# Patient Record
Sex: MALE | HISPANIC OR LATINO | Employment: OTHER | URBAN - METROPOLITAN AREA
[De-identification: names, ages, dates, MRNs, and addresses within clinical notes are randomized per-mention and may not be internally consistent; named-entity substitution may affect disease eponyms.]

---

## 2017-12-06 ENCOUNTER — OFFICE VISIT (OUTPATIENT)
Dept: URGENT CARE | Facility: PHYSICIAN GROUP | Age: 67
End: 2017-12-06
Payer: MEDICARE

## 2017-12-06 ENCOUNTER — TELEPHONE (OUTPATIENT)
Dept: URGENT CARE | Facility: PHYSICIAN GROUP | Age: 67
End: 2017-12-06

## 2017-12-06 VITALS
SYSTOLIC BLOOD PRESSURE: 142 MMHG | RESPIRATION RATE: 16 BRPM | DIASTOLIC BLOOD PRESSURE: 84 MMHG | HEIGHT: 69 IN | BODY MASS INDEX: 30.21 KG/M2 | TEMPERATURE: 98 F | WEIGHT: 204 LBS | HEART RATE: 78 BPM | OXYGEN SATURATION: 97 %

## 2017-12-06 DIAGNOSIS — K04.7 TOOTH INFECTION: ICD-10-CM

## 2017-12-06 DIAGNOSIS — K00.4 ENAMEL DEFECT OF TOOTH: ICD-10-CM

## 2017-12-06 PROCEDURE — 99214 OFFICE O/P EST MOD 30 MIN: CPT | Performed by: PHYSICIAN ASSISTANT

## 2017-12-06 RX ORDER — TRAMADOL HYDROCHLORIDE 50 MG/1
50 TABLET ORAL EVERY 4 HOURS PRN
Qty: 15 TAB | Refills: 0 | Status: SHIPPED | OUTPATIENT
Start: 2017-12-06 | End: 2017-12-09

## 2017-12-06 RX ORDER — LISINOPRIL AND HYDROCHLOROTHIAZIDE 20; 12.5 MG/1; MG/1
1 TABLET ORAL DAILY
COMMUNITY
Start: 2017-11-30 | End: 2023-03-16 | Stop reason: SDUPTHER

## 2017-12-06 RX ORDER — AMOXICILLIN 875 MG/1
875 TABLET, COATED ORAL 2 TIMES DAILY
Qty: 20 TAB | Refills: 0 | Status: SHIPPED | OUTPATIENT
Start: 2017-12-06 | End: 2017-12-16

## 2017-12-06 RX ORDER — LEVOTHYROXINE SODIUM 125 MCG
TABLET ORAL
COMMUNITY
Start: 2017-10-23 | End: 2019-03-10

## 2017-12-06 ASSESSMENT — ENCOUNTER SYMPTOMS
EYES NEGATIVE: 1
NEUROLOGICAL NEGATIVE: 1
CONSTITUTIONAL NEGATIVE: 1
MUSCULOSKELETAL NEGATIVE: 1
GASTROINTESTINAL NEGATIVE: 1
RESPIRATORY NEGATIVE: 1
PSYCHIATRIC NEGATIVE: 1
CARDIOVASCULAR NEGATIVE: 1

## 2017-12-06 NOTE — PROGRESS NOTES
Subjective:      Deepak Macdonald is a 67 y.o. male who presents with Other (tooth pain upper right side X 2 days )            HPI  Chief Complaint   Patient presents with   • Other     tooth pain upper right side X 2 days        HPI:  Deepak Macdonald is a 67 y.o. Male who presents with son with right upper tooth pain for 2 days. No nausea or fever. History of tooth infections in the past. Does have an appointment with the dentist on December 20. Exquisitely tender to heat and cold. No bad taste or purulent discharge. Patient denies HA, SOB, chest pain, palpitations, fever, chills, or n/v/d.      Past Medical History:   Diagnosis Date   • cancer 2004    lymphoma   • Cancer (CMS-[facility]) 2007    thyroid   • Depression    • Heart burn    • High cholesterol    • Kidney stones    • Lymphoma (CMS-[facility])     2004   • Snoring    • Thyroid disease        Past Surgical History:   Procedure Laterality Date   • CYSTOSCOPY  9/16/2015    Procedure: CYSTOSCOPY;  Surgeon: Sd Khanna M.D.;  Location: [facility];  Service:    • URETEROSCOPY Right 9/16/2015    Procedure: URETEROSCOPY;  Surgeon: Sd Khanna M.D.;  Location: [facility];  Service:    • LASERTRIPSY Right 9/16/2015    Procedure: LASERTRIPSY LITHO;  Surgeon: Sd Khanna M.D.;  Location: [facility];  Service:    • THYROIDECTOMY  2007   • OTHER      lymph nodes removed       Family History   Problem Relation Age of Onset   • Cancer Sister      breast     No pertinent family history.    Social History     Social History   • Marital status:      Spouse name: N/A   • Number of children: N/A   • Years of education: N/A     Occupational History   • Not on file.     Social History Main Topics   • Smoking status: Never Smoker   • Smokeless tobacco: Never Used   • Alcohol use No      Comment: quit drinking about a year ago   • Drug use: No   • Sexual activity: Yes     Partners: Female     Other Topics Concern   • Not on file  "    Social History Narrative   • No narrative on file         Current Outpatient Prescriptions:   •  lisinopril-hydrochlorothiazide,   •  SYNTHROID,   •  amoxicillin, 875 mg, Oral, BID  •  tramadol, 50 mg, Oral, Q4HRS PRN  •  levothyroxine, 137 mcg, Oral, AM ES  •  atorvastatin, 10 mg, Oral, Nightly  •  tramadol,  mg, Oral, Q6HRS PRN    No Known Allergies      Review of Systems   Constitutional: Negative.    HENT:        Tooth infection, tooth pain   Eyes: Negative.    Respiratory: Negative.    Cardiovascular: Negative.    Gastrointestinal: Negative.    Genitourinary: Negative.    Musculoskeletal: Negative.    Skin: Negative.    Neurological: Negative.    Endo/Heme/Allergies: Negative.    Psychiatric/Behavioral: Negative.           Objective:     /84   Pulse 78   Temp 36.7 °C (98 °F)   Resp 16   Ht 1.763 m (5' 9.41\")   Wt 92.5 kg (204 lb)   SpO2 97%   BMI 29.77 kg/m²      Physical Exam       Nursing note and Vitals Reviewed.    Constitutional:   Appropriately groomed, pleasant affect, well nourished, in NAD.    Head:   Normocephalic, atraumatic.    Eyes:   PERRLA, EOM's full, sclera white, conjunctiva not erythematous, and medial canthus without exudate bilaterally.    Ears:  Auricle and tragus non-tender to manipulation.  No pre-auricular lymphadenopathy or mastoid ttp.  EACs with mild cerumen bilaterally, not erythematous.  TM’s pearly gray with cone of light present and umbo and malleolus visible bilaterally.  No bulging or fluid bubbles present in middle ear.  Hearing grossly intact to voice.    Nose:  Nares patent bilaterally.  Nasal mucosa edematous with white rhinorrhea bilaterally. Sinuses not tender to percussion.    Throat:  Dentition poor with multiple caries present, mucosa moist without lesions. Right upper canine with enamel erosion on the buccal surface, exquisitely tender to palpation. Oropharynx not  erythematous, with no enlargement of the palatine tonsils bilaterally with " outexudates.    Mild post nasal drainage present.  Soft palate rises symmetrically bilaterally and uvula midline.      Neck: Neck supple, with moderate anterior lymphadenopathy that is soft and mobile to palpation. Thyroid non-palpable without tenderness or nodules. No supraclavicular lymphadenopathy.    Lungs:  Respiratory effort not labored without accessory muscle use.  Lungs clear to auscultation bilaterally without wheezes, rales, or rhonchi.    Heart:  RRR, without murmurs rubs or gallops.  Radial and dorsalis pedis pulse 2+ bilaterally.  No LE edema.    Musculoskeletal:  Gait non-antalgic with a narrow base.    Derm:  Skin without rashes or lesions with good turgor pressure.      Psychiatric:  Mood, affect, and judgement appropriate.       Assessment/Plan:     1. Tooth infection  amoxicillin (AMOXIL) 875 MG tablet    tramadol (ULTRAM) 50 MG Tab   2. Enamel defect of tooth  amoxicillin (AMOXIL) 875 MG tablet    tramadol (ULTRAM) 50 MG Tab      Patient presents with right upper canine tooth infection with enamel erosion and root exposure/dentin exposure. Plan to treat with amoxicillin and salt water gargles. Patient has tolerated this in the past for previous tooth infection 2015. As a prescribed tramadol. Recommended ibuprofen and Tylenol combination and tramadol staggered every 2-3 hours. Advised patient contact dentist for possible early cancellations.    Narcotic use report obtained with no indication of narcotic overuse or misuse and deemed necessary for treatment. Sedation, dependence, and constipation precautions given. Avoid use while driving or operating heavy machinery.     Patient was in agreement with this treatment plan and seemed to understand without barriers. All questions were encouraged and answered.  Reviewed signs and symptoms of when to seek emergency medical care.     Please note that this dictation was created using voice recognition software.  I have made every reasonable attempt to  correct obvious errors, but I expect there are errors of jose and possibly content that I did not discover before finalizing the note.

## 2017-12-06 NOTE — PATIENT INSTRUCTIONS
1 tablet of tylenol with 1 tablet of ibuprofen.  Take every 6 hours with food.    Take the tramadol every 4-6 hours and consider off setting every 2-3 hours.  Amoxicillin 2 times a day for 10 days.  Salt water gargles, warm.  Call dentist.

## 2017-12-06 NOTE — TELEPHONE ENCOUNTER
Pt called stating medication he was prescribed this morning is not working and his pain is really bad. I encouraged him to give the med's a chance to start working as it's only been a few minutes since he picked it up and took it.     Pt stated he understood and had no further questions.

## 2019-03-10 ENCOUNTER — APPOINTMENT (OUTPATIENT)
Dept: RADIOLOGY | Facility: MEDICAL CENTER | Age: 69
End: 2019-03-10
Attending: EMERGENCY MEDICINE
Payer: MEDICARE

## 2019-03-10 ENCOUNTER — OFFICE VISIT (OUTPATIENT)
Dept: URGENT CARE | Facility: PHYSICIAN GROUP | Age: 69
End: 2019-03-10
Payer: MEDICARE

## 2019-03-10 ENCOUNTER — HOSPITAL ENCOUNTER (OUTPATIENT)
Facility: MEDICAL CENTER | Age: 69
End: 2019-03-11
Attending: EMERGENCY MEDICINE | Admitting: INTERNAL MEDICINE
Payer: MEDICARE

## 2019-03-10 VITALS
HEIGHT: 69 IN | DIASTOLIC BLOOD PRESSURE: 82 MMHG | OXYGEN SATURATION: 97 % | SYSTOLIC BLOOD PRESSURE: 152 MMHG | TEMPERATURE: 98 F | WEIGHT: 204 LBS | HEART RATE: 88 BPM | RESPIRATION RATE: 18 BRPM | BODY MASS INDEX: 30.21 KG/M2

## 2019-03-10 DIAGNOSIS — R51.9 ACUTE NONINTRACTABLE HEADACHE, UNSPECIFIED HEADACHE TYPE: ICD-10-CM

## 2019-03-10 DIAGNOSIS — R42 DIZZINESS: ICD-10-CM

## 2019-03-10 DIAGNOSIS — R07.9 CHEST PAIN, UNSPECIFIED TYPE: ICD-10-CM

## 2019-03-10 PROBLEM — I10 ESSENTIAL HYPERTENSION: Status: ACTIVE | Noted: 2019-03-10

## 2019-03-10 LAB
ALBUMIN SERPL BCP-MCNC: 4.7 G/DL (ref 3.2–4.9)
ALBUMIN/GLOB SERPL: 1.7 G/DL
ALP SERPL-CCNC: 81 U/L (ref 30–99)
ALT SERPL-CCNC: 23 U/L (ref 2–50)
ANION GAP SERPL CALC-SCNC: 11 MMOL/L (ref 0–11.9)
AST SERPL-CCNC: 23 U/L (ref 12–45)
BASOPHILS # BLD AUTO: 0.8 % (ref 0–1.8)
BASOPHILS # BLD: 0.05 K/UL (ref 0–0.12)
BILIRUB SERPL-MCNC: 0.8 MG/DL (ref 0.1–1.5)
BUN SERPL-MCNC: 15 MG/DL (ref 8–22)
CALCIUM SERPL-MCNC: 9.9 MG/DL (ref 8.5–10.5)
CHLORIDE SERPL-SCNC: 104 MMOL/L (ref 96–112)
CO2 SERPL-SCNC: 24 MMOL/L (ref 20–33)
CREAT SERPL-MCNC: 0.86 MG/DL (ref 0.5–1.4)
EKG IMPRESSION: NORMAL
EKG IMPRESSION: NORMAL
EOSINOPHIL # BLD AUTO: 0.04 K/UL (ref 0–0.51)
EOSINOPHIL NFR BLD: 0.6 % (ref 0–6.9)
ERYTHROCYTE [DISTWIDTH] IN BLOOD BY AUTOMATED COUNT: 45.4 FL (ref 35.9–50)
GLOBULIN SER CALC-MCNC: 2.7 G/DL (ref 1.9–3.5)
GLUCOSE BLD-MCNC: 129 MG/DL (ref 70–100)
GLUCOSE SERPL-MCNC: 103 MG/DL (ref 65–99)
HCT VFR BLD AUTO: 45.3 % (ref 42–52)
HGB BLD-MCNC: 15.4 G/DL (ref 14–18)
IMM GRANULOCYTES # BLD AUTO: 0.02 K/UL (ref 0–0.11)
IMM GRANULOCYTES NFR BLD AUTO: 0.3 % (ref 0–0.9)
LIPASE SERPL-CCNC: 19 U/L (ref 11–82)
LYMPHOCYTES # BLD AUTO: 1.49 K/UL (ref 1–4.8)
LYMPHOCYTES NFR BLD: 22.4 % (ref 22–41)
MCH RBC QN AUTO: 29.7 PG (ref 27–33)
MCHC RBC AUTO-ENTMCNC: 34 G/DL (ref 33.7–35.3)
MCV RBC AUTO: 87.3 FL (ref 81.4–97.8)
MONOCYTES # BLD AUTO: 0.47 K/UL (ref 0–0.85)
MONOCYTES NFR BLD AUTO: 7.1 % (ref 0–13.4)
NEUTROPHILS # BLD AUTO: 4.57 K/UL (ref 1.82–7.42)
NEUTROPHILS NFR BLD: 68.8 % (ref 44–72)
NRBC # BLD AUTO: 0 K/UL
NRBC BLD-RTO: 0 /100 WBC
PLATELET # BLD AUTO: 208 K/UL (ref 164–446)
PMV BLD AUTO: 11.2 FL (ref 9–12.9)
POTASSIUM SERPL-SCNC: 4 MMOL/L (ref 3.6–5.5)
PROT SERPL-MCNC: 7.4 G/DL (ref 6–8.2)
RBC # BLD AUTO: 5.19 M/UL (ref 4.7–6.1)
SODIUM SERPL-SCNC: 139 MMOL/L (ref 135–145)
T4 FREE SERPL-MCNC: 1.29 NG/DL (ref 0.53–1.43)
TROPONIN I SERPL-MCNC: <0.01 NG/ML (ref 0–0.04)
TSH SERPL DL<=0.005 MIU/L-ACNC: 0.54 UIU/ML (ref 0.38–5.33)
WBC # BLD AUTO: 6.6 K/UL (ref 4.8–10.8)

## 2019-03-10 PROCEDURE — 99214 OFFICE O/P EST MOD 30 MIN: CPT | Performed by: NURSE PRACTITIONER

## 2019-03-10 PROCEDURE — A9270 NON-COVERED ITEM OR SERVICE: HCPCS | Performed by: EMERGENCY MEDICINE

## 2019-03-10 PROCEDURE — 96375 TX/PRO/DX INJ NEW DRUG ADDON: CPT

## 2019-03-10 PROCEDURE — 84439 ASSAY OF FREE THYROXINE: CPT

## 2019-03-10 PROCEDURE — G0378 HOSPITAL OBSERVATION PER HR: HCPCS

## 2019-03-10 PROCEDURE — A9270 NON-COVERED ITEM OR SERVICE: HCPCS | Performed by: INTERNAL MEDICINE

## 2019-03-10 PROCEDURE — 84443 ASSAY THYROID STIM HORMONE: CPT

## 2019-03-10 PROCEDURE — 85025 COMPLETE CBC W/AUTO DIFF WBC: CPT

## 2019-03-10 PROCEDURE — 71045 X-RAY EXAM CHEST 1 VIEW: CPT

## 2019-03-10 PROCEDURE — 700111 HCHG RX REV CODE 636 W/ 250 OVERRIDE (IP): Performed by: EMERGENCY MEDICINE

## 2019-03-10 PROCEDURE — 700102 HCHG RX REV CODE 250 W/ 637 OVERRIDE(OP): Performed by: INTERNAL MEDICINE

## 2019-03-10 PROCEDURE — 93000 ELECTROCARDIOGRAM COMPLETE: CPT | Performed by: NURSE PRACTITIONER

## 2019-03-10 PROCEDURE — 93005 ELECTROCARDIOGRAM TRACING: CPT | Performed by: EMERGENCY MEDICINE

## 2019-03-10 PROCEDURE — 84484 ASSAY OF TROPONIN QUANT: CPT | Mod: 91

## 2019-03-10 PROCEDURE — 96374 THER/PROPH/DIAG INJ IV PUSH: CPT

## 2019-03-10 PROCEDURE — 93010 ELECTROCARDIOGRAM REPORT: CPT | Performed by: INTERNAL MEDICINE

## 2019-03-10 PROCEDURE — 700102 HCHG RX REV CODE 250 W/ 637 OVERRIDE(OP): Performed by: EMERGENCY MEDICINE

## 2019-03-10 PROCEDURE — 99285 EMERGENCY DEPT VISIT HI MDM: CPT

## 2019-03-10 PROCEDURE — 83690 ASSAY OF LIPASE: CPT

## 2019-03-10 PROCEDURE — 36415 COLL VENOUS BLD VENIPUNCTURE: CPT

## 2019-03-10 PROCEDURE — 93005 ELECTROCARDIOGRAM TRACING: CPT | Performed by: INTERNAL MEDICINE

## 2019-03-10 PROCEDURE — 82962 GLUCOSE BLOOD TEST: CPT | Performed by: NURSE PRACTITIONER

## 2019-03-10 PROCEDURE — 80053 COMPREHEN METABOLIC PANEL: CPT

## 2019-03-10 PROCEDURE — 99220 PR INITIAL OBSERVATION CARE,LEVL III: CPT | Performed by: INTERNAL MEDICINE

## 2019-03-10 RX ORDER — ONDANSETRON 2 MG/ML
4 INJECTION INTRAMUSCULAR; INTRAVENOUS ONCE
Status: COMPLETED | OUTPATIENT
Start: 2019-03-10 | End: 2019-03-10

## 2019-03-10 RX ORDER — LEVOTHYROXINE SODIUM 112 MCG
100 TABLET ORAL DAILY
COMMUNITY
Start: 2019-02-06

## 2019-03-10 RX ORDER — POLYETHYLENE GLYCOL 3350 17 G/17G
1 POWDER, FOR SOLUTION ORAL
Status: DISCONTINUED | OUTPATIENT
Start: 2019-03-10 | End: 2019-03-11 | Stop reason: HOSPADM

## 2019-03-10 RX ORDER — CHOLESTYRAMINE LIGHT 4 G/5.7G
4 POWDER, FOR SUSPENSION ORAL DAILY
COMMUNITY
End: 2023-03-16

## 2019-03-10 RX ORDER — METOCLOPRAMIDE HYDROCHLORIDE 5 MG/ML
10 INJECTION INTRAMUSCULAR; INTRAVENOUS ONCE
Status: COMPLETED | OUTPATIENT
Start: 2019-03-10 | End: 2019-03-10

## 2019-03-10 RX ORDER — ASPIRIN 300 MG/1
300 SUPPOSITORY RECTAL DAILY
Status: DISCONTINUED | OUTPATIENT
Start: 2019-03-10 | End: 2019-03-11 | Stop reason: HOSPADM

## 2019-03-10 RX ORDER — ASPIRIN 325 MG
325 TABLET ORAL DAILY
Status: DISCONTINUED | OUTPATIENT
Start: 2019-03-10 | End: 2019-03-11 | Stop reason: HOSPADM

## 2019-03-10 RX ORDER — LISINOPRIL 20 MG/1
20 TABLET ORAL
Status: DISCONTINUED | OUTPATIENT
Start: 2019-03-10 | End: 2019-03-11 | Stop reason: HOSPADM

## 2019-03-10 RX ORDER — BISACODYL 10 MG
10 SUPPOSITORY, RECTAL RECTAL
Status: DISCONTINUED | OUTPATIENT
Start: 2019-03-10 | End: 2019-03-11 | Stop reason: HOSPADM

## 2019-03-10 RX ORDER — ACETAMINOPHEN 325 MG/1
650 TABLET ORAL EVERY 6 HOURS PRN
Status: DISCONTINUED | OUTPATIENT
Start: 2019-03-10 | End: 2019-03-11 | Stop reason: HOSPADM

## 2019-03-10 RX ORDER — AMOXICILLIN 250 MG
2 CAPSULE ORAL 2 TIMES DAILY
Status: DISCONTINUED | OUTPATIENT
Start: 2019-03-10 | End: 2019-03-11 | Stop reason: HOSPADM

## 2019-03-10 RX ORDER — LEVOTHYROXINE SODIUM 112 UG/1
112 TABLET ORAL
Status: DISCONTINUED | OUTPATIENT
Start: 2019-03-11 | End: 2019-03-11 | Stop reason: HOSPADM

## 2019-03-10 RX ORDER — M-VIT,TX,IRON,MINS/CALC/FOLIC 27MG-0.4MG
1 TABLET ORAL DAILY
COMMUNITY

## 2019-03-10 RX ORDER — ACETAMINOPHEN 325 MG/1
650 TABLET ORAL ONCE
Status: COMPLETED | OUTPATIENT
Start: 2019-03-10 | End: 2019-03-10

## 2019-03-10 RX ORDER — ASPIRIN 81 MG/1
324 TABLET, CHEWABLE ORAL DAILY
Status: DISCONTINUED | OUTPATIENT
Start: 2019-03-10 | End: 2019-03-11 | Stop reason: HOSPADM

## 2019-03-10 RX ADMIN — LISINOPRIL 20 MG: 20 TABLET ORAL at 16:54

## 2019-03-10 RX ADMIN — ACETAMINOPHEN 650 MG: 325 TABLET, FILM COATED ORAL at 14:12

## 2019-03-10 RX ADMIN — ASPIRIN 325 MG: 325 TABLET ORAL at 16:54

## 2019-03-10 RX ADMIN — METOCLOPRAMIDE 10 MG: 5 INJECTION, SOLUTION INTRAMUSCULAR; INTRAVENOUS at 14:11

## 2019-03-10 RX ADMIN — ONDANSETRON 4 MG: 2 INJECTION INTRAMUSCULAR; INTRAVENOUS at 14:09

## 2019-03-10 ASSESSMENT — ENCOUNTER SYMPTOMS
PALPITATIONS: 0
DIARRHEA: 0
FOCAL WEAKNESS: 0
NERVOUS/ANXIOUS: 0
BLURRED VISION: 0
DIZZINESS: 0
VOMITING: 0
SEIZURES: 0
BACK PAIN: 0
COUGH: 0
ABDOMINAL PAIN: 0
SPUTUM PRODUCTION: 0
EYE REDNESS: 0
EYE PAIN: 0
MYALGIAS: 0
SHORTNESS OF BREATH: 0
WEIGHT LOSS: 0
FEVER: 0
NAUSEA: 0
CHILLS: 0
ORTHOPNEA: 0
EYE DISCHARGE: 0
DEPRESSION: 0
INSOMNIA: 0
STRIDOR: 0
NECK PAIN: 0
HEARTBURN: 0
HEADACHES: 0

## 2019-03-10 ASSESSMENT — LIFESTYLE VARIABLES
ALCOHOL_USE: NO
DO YOU DRINK ALCOHOL: NO
EVER_SMOKED: NEVER

## 2019-03-10 ASSESSMENT — PATIENT HEALTH QUESTIONNAIRE - PHQ9
2. FEELING DOWN, DEPRESSED, IRRITABLE, OR HOPELESS: NOT AT ALL
1. LITTLE INTEREST OR PLEASURE IN DOING THINGS: NOT AT ALL
SUM OF ALL RESPONSES TO PHQ9 QUESTIONS 1 AND 2: 0
1. LITTLE INTEREST OR PLEASURE IN DOING THINGS: NOT AT ALL
SUM OF ALL RESPONSES TO PHQ9 QUESTIONS 1 AND 2: 0
2. FEELING DOWN, DEPRESSED, IRRITABLE, OR HOPELESS: NOT AT ALL

## 2019-03-10 NOTE — ED TRIAGE NOTES
".  Chief Complaint   Patient presents with   • Sent from Urgent Care   Pt reports having 3/10 chest pain yesterday, resolved. Pt states \" felt palpitations, then it went away. \"   No N/V.  No difficulty breathing.  Right parietal region headache onset this morning at 0600.  No dizziness.  + minor lightheadedness.  No ear pain/congestion.    "

## 2019-03-10 NOTE — ED PROVIDER NOTES
"ED Provider Note    Scribed for Fritz Perez M.D. by Shad Duran. 3/10/2019, 1:50 PM.    Primary care provider: David Solis D.O.  Means of arrival: Walk In  History obtained from: Patient  History limited by: None    CHIEF COMPLAINT  Chief Complaint   Patient presents with   • Sent from Urgent Care       HPI  Deepak Macdonald is a 68 y.o. Male with a history of lymphoma who presents to the Emergency Department for evaluation of an episode of substernal chest pain occurring yesterday. The patient reports that he was sitting at home when he first noticed the pain. He describes it as feeling like \"a pulsing fluttering pain\", noting that it lasted about 5-10 minutes. He states that he went to urgent care for evaluation of the chest pain episode today and they referred him to the ED for further evaluation. He denies experiencing any associated shortness of breath or diaphoresis at the time of the chest pain. His last stress test was 15 years ago and he has not had any cardiac workup since that time. He does not smoke cigarettes and quit drinking alcohol one year ago. The patient also complains of an intermittent right-sided headache which had a gradual onset yesterday. He notes that the headache is mild and that he notices it when he stands up, describing it as if his \"head feels heavy\". His headache is not exacerbated by light or sound. He confirms that he is not a frequent water drinker. He denies experiencing any nausea, vomiting, numbness, or tingling.     REVIEW OF SYSTEMS  Pertinent positives include chest pain, and headache. Pertinent negatives include no cigarette use, alcohol use, shortness of breath, diaphoresis, nausea, vomiting, numbness, or tingling. All other systems negative.    PAST MEDICAL HISTORY   has a past medical history of cancer (2004); Cancer (HCC) (2007); Depression; Heart burn; High cholesterol; Kidney stones; Lymphoma (HCC); Snoring; and Thyroid disease.    SURGICAL " "HISTORY   has a past surgical history that includes thyroidectomy (2007); other; cystoscopy (9/16/2015); ureteroscopy (Right, 9/16/2015); and lasertripsy (Right, 9/16/2015).    SOCIAL HISTORY  Social History   Substance Use Topics   • Smoking status: Never Smoker   • Smokeless tobacco: Never Used   • Alcohol use No      Comment: quit drinking about a year ago      History   Drug Use No       FAMILY HISTORY  Family History   Problem Relation Age of Onset   • Cancer Sister         breast       CURRENT MEDICATIONS  Home Medications     Reviewed by Sana Jennings R.N. (Registered Nurse) on 03/10/19 at 1650  Med List Status: Complete   Medication Last Dose Status   cholestyramine light (PREVALITE) 4 GM/DOSE powder 3/9/2019 Active   lisinopril-hydrochlorothiazide (PRINZIDE, ZESTORETIC) 20-12.5 MG per tablet 3/10/2019 Active   SYNTHROID 112 MCG Tab 3/10/2019 Active   therapeutic multivitamin-minerals (THERAGRAN-M) Tab 3/10/2019 Active                ALLERGIES  No Known Allergies    PHYSICAL EXAM  VITAL SIGNS: /88   Pulse 93   Temp 36.7 °C (98 °F) (Temporal)   Resp 17   Ht 1.803 m (5' 11\")   Wt 93.7 kg (206 lb 9.1 oz)   SpO2 97%   BMI 28.81 kg/m²     Constitutional: Well developed, Well nourished, mild distress.   HENT: Normocephalic, Atraumatic, Oropharynx moist.   Eyes: Conjunctiva normal, No discharge.   Neck: Supple, No stridor, no meningismus   Cardiovascular: Normal heart rate, Normal rhythm, No murmurs, equal pulses. Heart score of 4.   Pulmonary: Normal breath sounds, No respiratory distress, No wheezing, No rales, No rhonchi.  Chest: No chest wall tenderness or deformity.   Abdomen:Soft, No tenderness, No masses, no rebound, no guarding.   Musculoskeletal: No major deformities noted, No tenderness. No calf pain. No edema. No chords. Calves appear symmetric.   Skin: Warm, Dry, No erythema, No rash.   Neurologic: Alert & oriented x 3, Normal motor function,  No focal deficits noted. Normal finger to " nose, Normal cranial nerves II-XII, No pronator drift. Equal strength in upper and lower extremities bilaterally.  Psychiatric: Affect normal, Judgment normal, Mood normal.     LABS  Results for orders placed or performed during the hospital encounter of 03/10/19   CBC WITH DIFFERENTIAL   Result Value Ref Range    WBC 6.6 4.8 - 10.8 K/uL    RBC 5.19 4.70 - 6.10 M/uL    Hemoglobin 15.4 14.0 - 18.0 g/dL    Hematocrit 45.3 42.0 - 52.0 %    MCV 87.3 81.4 - 97.8 fL    MCH 29.7 27.0 - 33.0 pg    MCHC 34.0 33.7 - 35.3 g/dL    RDW 45.4 35.9 - 50.0 fL    Platelet Count 208 164 - 446 K/uL    MPV 11.2 9.0 - 12.9 fL    Neutrophils-Polys 68.80 44.00 - 72.00 %    Lymphocytes 22.40 22.00 - 41.00 %    Monocytes 7.10 0.00 - 13.40 %    Eosinophils 0.60 0.00 - 6.90 %    Basophils 0.80 0.00 - 1.80 %    Immature Granulocytes 0.30 0.00 - 0.90 %    Nucleated RBC 0.00 /100 WBC    Neutrophils (Absolute) 4.57 1.82 - 7.42 K/uL    Lymphs (Absolute) 1.49 1.00 - 4.80 K/uL    Monos (Absolute) 0.47 0.00 - 0.85 K/uL    Eos (Absolute) 0.04 0.00 - 0.51 K/uL    Baso (Absolute) 0.05 0.00 - 0.12 K/uL    Immature Granulocytes (abs) 0.02 0.00 - 0.11 K/uL    NRBC (Absolute) 0.00 K/uL   COMP METABOLIC PANEL   Result Value Ref Range    Sodium 139 135 - 145 mmol/L    Potassium 4.0 3.6 - 5.5 mmol/L    Chloride 104 96 - 112 mmol/L    Co2 24 20 - 33 mmol/L    Anion Gap 11.0 0.0 - 11.9    Glucose 103 (H) 65 - 99 mg/dL    Bun 15 8 - 22 mg/dL    Creatinine 0.86 0.50 - 1.40 mg/dL    Calcium 9.9 8.5 - 10.5 mg/dL    AST(SGOT) 23 12 - 45 U/L    ALT(SGPT) 23 2 - 50 U/L    Alkaline Phosphatase 81 30 - 99 U/L    Total Bilirubin 0.8 0.1 - 1.5 mg/dL    Albumin 4.7 3.2 - 4.9 g/dL    Total Protein 7.4 6.0 - 8.2 g/dL    Globulin 2.7 1.9 - 3.5 g/dL    A-G Ratio 1.7 g/dL   LIPASE   Result Value Ref Range    Lipase 19 11 - 82 U/L   TROPONIN   Result Value Ref Range    Troponin I <0.01 0.00 - 0.04 ng/mL   ESTIMATED GFR   Result Value Ref Range    GFR If  >60 >60  mL/min/1.73 m 2    GFR If Non African American >60 >60 mL/min/1.73 m 2   Troponin in four (4) hours   Result Value Ref Range    Troponin I <0.01 0.00 - 0.04 ng/mL   TSH   Result Value Ref Range    TSH 0.540 0.380 - 5.330 uIU/mL   FREE THYROXINE   Result Value Ref Range    Free T-4 1.29 0.53 - 1.43 ng/dL   EKG (NOW)   Result Value Ref Range    Report       [facility] Emergency Dept.    Test Date:  2019-03-10  Pt Name:    RIK ELIZONDO                  Department: ER  MRN:        [id number]                      Room:        21  Gender:     Male                         Technician: [id number]  :        1950                   Requested By:ER TRIAGE PROTOCOL  Order #:    [id number]                    Reading MD: SANTOS REIS MD    Measurements  Intervals                                Axis  Rate:       86                           P:          -8  ID:         [id number]                          QRS:        -55  QRSD:       102                          T:          52  QT:         372  QTc:        445    Interpretive Statements  SINUS RHYTHM  FIRST DEGREE AV BLOCK  LAD, CONSIDER LAFB OR INFERIOR INFARCT  LEFT VENTRICULAR HYPERTROPHY  ANTERIOR INFARCT, OLD  Compared to ECG 2015 08:25:20  Myocardial infarct finding now present  T-wave abnormality no longer present    Electronically Signed On 3- 15:27:25 PDT by RODDY REIS MD         All labs reviewed by me.    EKG  12 Lead EKG interpreted by me shows a normal sinus rhythm at a rate of 86. Leftward Axis. No ST elevations. No T wave inversions. 1st degree AV block. Old Anterior infarct.  ID interval 228.  QTc 445.  Old EKG from 2015 shows that the old anterior infarct finding is new. Final impression: Nonspecific EKG.    RADIOLOGY  DX-CHEST-PORTABLE (1 VIEW)   Final Result      No acute cardiopulmonary findings.      NM-CARDIAC STRESS TEST    (Results Pending)     The radiologist's interpretation of all radiological studies have  been reviewed by me.    COURSE & MEDICAL DECISION MAKING  Pertinent Labs & Imaging studies reviewed. (See chart for details)    1:50 PM - Patient seen and examined at bedside. Patient will be treated with Zofran 4 mg, Reglan 10 mg, and Tylenol 650 mg. Ordered Dx-Chest, CBC, CMP, Lipase, Troponin, and EKG to evaluate his symptoms. The differential diagnoses include but are not limited to: Myocardial Infarction, Atypical chest pain, chronic headache, and migraine.       3:23 PM Patient reevaluated at bedside. He reports feeling improvement following medication administration. I informed him of the diagnostic results and updated him on the plan of care including hospital admission for a chest pain rule out. He understood and agreed to the plan of care including hospital admission.     3:25 PM Consult with Dr. Ramachandran (Internal Medicine) who agreed to admit the patient.     Medical Decision Making: This point time I think patient would benefit from admission with continued risk stratification patient does have new changes on his EKG suggestive of an anterior infarct.  Given the chest pain he had I think he would benefit from repeat enzymes and probable stress test.  As far as the patient's headache a do not think this is subarachnoid hemorrhage it came on gradually not thunderclap not the worst headache of his life.  Does not show any signs of meningismus.  His headache is gone away with migraine cocktail.    DISPOSITION:  Patient will be admitted to Dr. Ramachandran (Internal Medicine) in guarded condition.     FINAL IMPRESSION  1. Chest pain, unspecified type          I, Shad Duran (Scribe), am scribing for, and in the presence of, Fritz Perez M.D.    Electronically signed by: Shad Duran (Scribe), 3/10/2019    IFritz M.D. personally performed the services described in this documentation, as scribed by Shad Duran in my presence, and it is both accurate and complete. C.     The  note accurately reflects work and decisions made by me.  Fritz Perez  3/10/2019  7:59 PM

## 2019-03-10 NOTE — PROGRESS NOTES
Pt arrived to unit via WC at 1605. Pt oriented to room, unit, and plan of care. Tele-monitor placed. All questions answered at this time. Call light within reach, fall precautions in place, will continue to monitor. MD to bedside

## 2019-03-10 NOTE — PROGRESS NOTES
Chief Complaint   Patient presents with   • Head Ache     couldn't sleep last night.       HISTORY OF PRESENT ILLNESS: Patient is a 68 y.o. male with a history of lymphoma, dyslipidemia, hypothyroidism, and hypertension who presents to urgent care today with complaints of a headache, dizziness and chest pain. Notes that since yesterday he has had a right-sided parietal headache.  Described as dull, constant.  Admits he had a short episode of substernal chest pain yesterday as well.  Admits to malaise and dizziness, described as feeling unbalanced.  He denies associated nausea, shortness of breath, leg swelling, neck pain, confusion, fever, weakness, numbness, tingling changes in vision.  He denies any injury or trauma.  He denies any significant cardiac events in his past.  He has not taken any medication for symptom relief.      Patient Active Problem List    Diagnosis Date Noted   • BMI 30.0-30.9,adult 03/11/2016   • Hypothyroidism 03/11/2016   • Dyslipidemia 03/11/2016   • History of lymphoma 03/11/2016   • Calculus of kidney 09/16/2015       Allergies:Patient has no known allergies.    Current Outpatient Prescriptions Ordered in [facility]   Medication Sig Dispense Refill   • cholestyramine light (PREVALITE) 4 GM/DOSE powder Take  by mouth.     • therapeutic multivitamin-minerals (THERAGRAN-M) Tab Take 1 Tab by mouth every day.     • SYNTHROID 112 MCG Tab      • lisinopril-hydrochlorothiazide (PRINZIDE, ZESTORETIC) 20-12.5 MG per tablet        No current Epic-ordered facility-administered medications on file.        Past Medical History:   Diagnosis Date   • cancer 2004    lymphoma   • Cancer (HCC) 2007    thyroid   • Depression    • Heart burn    • High cholesterol    • Kidney stones    • Lymphoma (HCC)     2004   • Snoring    • Thyroid disease        Social History   Substance Use Topics   • Smoking status: Never Smoker   • Smokeless tobacco: Never Used   • Alcohol use No      Comment: quit drinking about a year ago  "      Family Status   Relation Status   • Fa    • Sis (Not Specified)     Family History   Problem Relation Age of Onset   • Cancer Sister         breast       ROS:  Review of Systems   Constitutional: Positive for fatigue and malaise.  Negative for fever, chills, weight loss.  HENT: Negative for ear pain, nosebleeds, congestion, sore throat and neck pain.    Eyes: Negative for vision changes.   Neuro: Positive for headache, dizziness.  Negative for sensory changes, weakness, seizure, LOC.   Cardiovascular: Positive for chest pain yesterday.  Negative for palpitations, orthopnea and leg swelling.   Respiratory: Negative for cough, sputum production, shortness of breath and wheezing.   Gastrointestinal: Negative for abdominal pain, nausea, vomiting or diarrhea.   Genitourinary: Negative for dysuria, urgency and frequency.  Musculoskeletal: Negative for falls, neck pain, back pain, joint pain, myalgias.   Skin: Negative for rash, diaphoresis.     Exam:  Blood pressure 152/82, pulse 88, temperature 36.7 °C (98 °F), temperature source Temporal, resp. rate 18, height 1.753 m (5' 9\"), weight 92.5 kg (204 lb), SpO2 97 %.  General: well-nourished, well-developed male in NAD  Head: normocephalic, atraumatic  Eyes: PERRLA, no conjunctival injection, acuity grossly intact, lids normal.  Ears: normal shape and symmetry, no tenderness, no discharge. External canals are without any significant edema or erythema. Tympanic membranes are without any inflammation, no effusion. Gross auditory acuity is intact.  Nose: symmetrical without tenderness, no discharge.  Mouth/Throat: reasonable hygiene, no erythema, exudates or tonsillar enlargement.  Neck: no masses, range of motion within normal limits, no tracheal deviation. No obvious thyroid enlargement.   Lymph: no cervical adenopathy. No supraclavicular adenopathy.   Neuro: alert and oriented. Cranial nerves 1-12 grossly intact. No sensory deficit.   Cardiovascular: regular " rate and rhythm. No edema.  Pulmonary: no distress. Chest is symmetrical with respiration, no wheezes, crackles, or rhonchi.   Musculoskeletal: no clubbing, appropriate muscle tone, gait is stable.  Skin: warm, dry, intact, no clubbing, no cyanosis, no rashes.   Psych: appropriate mood, affect, judgement.         Assessment/Plan:  1. Dizziness  POCT Glucose    EKG    UC AMA/Refusal of Treatment   2. Chest pain, unspecified type  UC AMA/Refusal of Treatment   3. Acute nonintractable headache, unspecified headache type  UC AMA/Refusal of Treatment         POC glucose 129        12-lead study EKG interpretation, interpreted by myself.  The rhythm is sinus with a rate of 82.  There are no acute ST segment changes and no T wave abnormalities.  Interpretation: No ST segment elevation myocardial infarction. Compared to previous EKG in 2015 without significant abnormalities.       Patient is a pleasant 68-year-old male with a history of dyslipidemia, hypertension, history of lymphoma, and hypothyroidism who presents the clinic today with new onset of headache, chest pain, and dizziness.  His blood sugar today is 129.  The lead EKG is without any ST elevation, no significant changes from prior EKG in 2015.  At this time, I feel the patient requires a higher level of care including closer monitoring, stat lab work and/or imaging for further evaluation as I cannot exclude cardiac and/or neurology etiology. This has been discussed with the patient and he states agreement and understanding. I discussed with him that I recommend EMS transport at this time. However, patient is deciding against medical advice. I discussed with the patient the risks of deciding against receiving appropriate care to include death. The patient is not intoxicated. The patient is a capable decision maker and understands the risks and benefits of his decision. While I certainly disagree with the patient's decision, I respect the patient's autonomy and  will not keep him here against his will. He understands that his decision to decline further care can be reversed at any time.  I have asked the patient to sign an AMA form and MA to witness this signature. The patient will be driven directly to his emergency department of choice by his son, he is in no acute distress upon departure.           Please note that this dictation was created using voice recognition software. I have made every reasonable attempt to correct obvious errors, but I expect that there are errors of grammar and possibly content that I did not discover before finalizing the note.      KATIA Royal.

## 2019-03-10 NOTE — H&P
Hospital Medicine History & Physical Note    Date of Service  3/10/2019    Primary Care Physician  David Solis D.O.    Consultants  Dr. Alvarado    Code Status  full    Chief Complaint  Chest pain    History of Presenting Illness  68 y.o. Male with PMH of HTN who presented 3/10/2019 with chest pain started earlier today.  He described the pain as discomfort, 4/10 intensity, no radiation, intermittent in nature.  Associated with palpitation at times.  Never had a history of coronary artery disease.  The patient denies smoking.  No family history of coronary artery disease per patient.  Initial work up were negative. He will be admitted for observation.      Review of Systems  Review of Systems   Constitutional: Negative for chills, fever and weight loss.   HENT: Negative for congestion and nosebleeds.    Eyes: Negative for blurred vision, pain, discharge and redness.   Respiratory: Negative for cough, sputum production, shortness of breath and stridor.    Cardiovascular: Positive for chest pain. Negative for palpitations and orthopnea.   Gastrointestinal: Negative for abdominal pain, diarrhea, heartburn, nausea and vomiting.   Genitourinary: Negative for dysuria, frequency and urgency.   Musculoskeletal: Negative for back pain, myalgias and neck pain.   Skin: Negative for itching and rash.   Neurological: Negative for dizziness, focal weakness, seizures and headaches.   Psychiatric/Behavioral: Negative for depression. The patient is not nervous/anxious and does not have insomnia.        Past Medical History   has a past medical history of cancer (2004); Cancer (HCC) (2007); Depression; Heart burn; High cholesterol; Kidney stones; Lymphoma (HCC); Snoring; and Thyroid disease. He also has no past medical history of Anesthesia.    Surgical History   has a past surgical history that includes thyroidectomy (2007); other; cystoscopy (9/16/2015); ureteroscopy (Right, 9/16/2015); and lasertripsy (Right, 9/16/2015).      Family History  family history includes Cancer in his sister.     Social History   reports that he has never smoked. He has never used smokeless tobacco. He reports that he does not drink alcohol or use drugs.    Allergies  No Known Allergies    Medications  Prior to Admission Medications   Prescriptions Last Dose Informant Patient Reported? Taking?   SYNTHROID 112 MCG Tab 3/10/2019 at Unknown time  Yes No   cholestyramine light (PREVALITE) 4 GM/DOSE powder 3/9/2019 at Unknown time  Yes No   Sig: Take  by mouth.   lisinopril-hydrochlorothiazide (PRINZIDE, ZESTORETIC) 20-12.5 MG per tablet 3/9/2019 at Unknown time  Yes No   therapeutic multivitamin-minerals (THERAGRAN-M) Tab 3/10/2019 at Unknown time  Yes No   Sig: Take 1 Tab by mouth every day.      Facility-Administered Medications: None       Physical Exam  Temp:  [36.7 °C (98 °F)] 36.7 °C (98 °F)  Pulse:  [93] 93  Resp:  [17] 17  BP: (143)/(88) 143/88  SpO2:  [97 %] 97 %    Physical Exam   Constitutional: He is oriented to person, place, and time. No distress.   HENT:   Head: Normocephalic and atraumatic.   Mouth/Throat: Oropharynx is clear and moist.   Eyes: Pupils are equal, round, and reactive to light. Conjunctivae and EOM are normal.   Neck: Normal range of motion. Neck supple. No tracheal deviation present. No thyromegaly present.   Cardiovascular: Normal rate and regular rhythm.    No murmur heard.  Pulmonary/Chest: Effort normal and breath sounds normal. No respiratory distress. He has no wheezes.   Abdominal: Soft. Bowel sounds are normal. He exhibits no distension. There is no tenderness.   Musculoskeletal: He exhibits no edema or tenderness.   Neurological: He is alert and oriented to person, place, and time. No cranial nerve deficit.   Skin: Skin is warm and dry. He is not diaphoretic. No erythema.   Psychiatric: He has a normal mood and affect. His behavior is normal. Thought content normal.       Laboratory:  Recent Labs      03/10/19   1355    WBC  6.6   RBC  5.19   HEMOGLOBIN  15.4   HEMATOCRIT  45.3   MCV  87.3   MCH  29.7   MCHC  34.0   RDW  45.4   PLATELETCT  208   MPV  11.2     Recent Labs      03/10/19   1355   SODIUM  139   POTASSIUM  4.0   CHLORIDE  104   CO2  24   GLUCOSE  103*   BUN  15   CREATININE  0.86   CALCIUM  9.9     Recent Labs      03/10/19   1355   ALTSGPT  23   ASTSGOT  23   ALKPHOSPHAT  81   TBILIRUBIN  0.8   LIPASE  19   GLUCOSE  103*                 Recent Labs      03/10/19   1355   TROPONINI  <0.01       Urinalysis:    No results found     Imaging:  DX-CHEST-PORTABLE (1 VIEW)   Final Result      No acute cardiopulmonary findings.      NM-CARDIAC STRESS TEST    (Results Pending)     ekg:  Per my interpretation  qtc 445, HR 86, sinus rhythm, no ST/T wave changes      Assessment/Plan:  I anticipate this patient is appropriate for observation status at this time.    Essential hypertension   Assessment & Plan    stable     Pain in the chest- (present on admission)   Assessment & Plan    Chest Pain ruleout  - Troponin and EKG were unremarkable in the ER.  - The patient will be monitored on telemetry and troponins will be trended  - lipid panel will be checked.   - I started on aspirin 325 mg qday  - NPO  - stress test in the morning     Hypothyroidism- (present on admission)   Assessment & Plan    On synthroid         VTE prophylaxis: lovenox

## 2019-03-10 NOTE — ED TRIAGE NOTES
".  Chief Complaint   Patient presents with   • Sent from Urgent Care     ./88   Pulse 93   Temp 36.7 °C (98 °F) (Temporal)   Resp 17   Ht 1.803 m (5' 11\")   Wt 93.7 kg (206 lb 9.1 oz)   SpO2 97%   BMI 28.81 kg/m²     Ambulatory to triage c/o 3/10 HA, denies blurred vision/photophobia, dizziness intermittently for a \"long time\", states \"when I turn around real fast I feel like I lose control\", denies N/T, IZQUIERDO =, no facial droop/slurred speech noted, denies chest pain at this time, educated on triage process, charge RN aware of patient arrival, patient to lobby w/family member, told to inform staff of any changes in condition.    "

## 2019-03-11 ENCOUNTER — APPOINTMENT (OUTPATIENT)
Dept: RADIOLOGY | Facility: MEDICAL CENTER | Age: 69
End: 2019-03-11
Attending: INTERNAL MEDICINE
Payer: MEDICARE

## 2019-03-11 ENCOUNTER — PATIENT OUTREACH (OUTPATIENT)
Dept: HEALTH INFORMATION MANAGEMENT | Facility: OTHER | Age: 69
End: 2019-03-11

## 2019-03-11 VITALS
HEART RATE: 73 BPM | OXYGEN SATURATION: 98 % | DIASTOLIC BLOOD PRESSURE: 68 MMHG | HEIGHT: 71 IN | WEIGHT: 204.81 LBS | SYSTOLIC BLOOD PRESSURE: 115 MMHG | RESPIRATION RATE: 16 BRPM | BODY MASS INDEX: 28.67 KG/M2 | TEMPERATURE: 98.7 F

## 2019-03-11 LAB
ALBUMIN SERPL BCP-MCNC: 3.9 G/DL (ref 3.2–4.9)
ALBUMIN/GLOB SERPL: 1.5 G/DL
ALP SERPL-CCNC: 64 U/L (ref 30–99)
ALT SERPL-CCNC: 18 U/L (ref 2–50)
ANION GAP SERPL CALC-SCNC: 9 MMOL/L (ref 0–11.9)
AST SERPL-CCNC: 19 U/L (ref 12–45)
BILIRUB SERPL-MCNC: 0.6 MG/DL (ref 0.1–1.5)
BUN SERPL-MCNC: 16 MG/DL (ref 8–22)
CALCIUM SERPL-MCNC: 8.4 MG/DL (ref 8.5–10.5)
CHLORIDE SERPL-SCNC: 108 MMOL/L (ref 96–112)
CO2 SERPL-SCNC: 23 MMOL/L (ref 20–33)
CREAT SERPL-MCNC: 0.88 MG/DL (ref 0.5–1.4)
ERYTHROCYTE [DISTWIDTH] IN BLOOD BY AUTOMATED COUNT: 47.1 FL (ref 35.9–50)
GLOBULIN SER CALC-MCNC: 2.6 G/DL (ref 1.9–3.5)
GLUCOSE SERPL-MCNC: 99 MG/DL (ref 65–99)
HCT VFR BLD AUTO: 45.5 % (ref 42–52)
HGB BLD-MCNC: 15 G/DL (ref 14–18)
MCH RBC QN AUTO: 29.2 PG (ref 27–33)
MCHC RBC AUTO-ENTMCNC: 33 G/DL (ref 33.7–35.3)
MCV RBC AUTO: 88.5 FL (ref 81.4–97.8)
PLATELET # BLD AUTO: 215 K/UL (ref 164–446)
PMV BLD AUTO: 11.6 FL (ref 9–12.9)
POTASSIUM SERPL-SCNC: 3.8 MMOL/L (ref 3.6–5.5)
PROT SERPL-MCNC: 6.5 G/DL (ref 6–8.2)
RBC # BLD AUTO: 5.14 M/UL (ref 4.7–6.1)
SODIUM SERPL-SCNC: 140 MMOL/L (ref 135–145)
TROPONIN I SERPL-MCNC: <0.01 NG/ML (ref 0–0.04)
WBC # BLD AUTO: 8.1 K/UL (ref 4.8–10.8)

## 2019-03-11 PROCEDURE — 700111 HCHG RX REV CODE 636 W/ 250 OVERRIDE (IP)

## 2019-03-11 PROCEDURE — 80053 COMPREHEN METABOLIC PANEL: CPT

## 2019-03-11 PROCEDURE — 36415 COLL VENOUS BLD VENIPUNCTURE: CPT

## 2019-03-11 PROCEDURE — 99217 PR OBSERVATION CARE DISCHARGE: CPT | Performed by: INTERNAL MEDICINE

## 2019-03-11 PROCEDURE — A9502 TC99M TETROFOSMIN: HCPCS

## 2019-03-11 PROCEDURE — 700111 HCHG RX REV CODE 636 W/ 250 OVERRIDE (IP): Performed by: INTERNAL MEDICINE

## 2019-03-11 PROCEDURE — 700102 HCHG RX REV CODE 250 W/ 637 OVERRIDE(OP): Performed by: INTERNAL MEDICINE

## 2019-03-11 PROCEDURE — G0378 HOSPITAL OBSERVATION PER HR: HCPCS

## 2019-03-11 PROCEDURE — 85027 COMPLETE CBC AUTOMATED: CPT

## 2019-03-11 PROCEDURE — 96372 THER/PROPH/DIAG INJ SC/IM: CPT

## 2019-03-11 PROCEDURE — A9270 NON-COVERED ITEM OR SERVICE: HCPCS | Performed by: INTERNAL MEDICINE

## 2019-03-11 RX ORDER — REGADENOSON 0.08 MG/ML
INJECTION, SOLUTION INTRAVENOUS
Status: COMPLETED
Start: 2019-03-11 | End: 2019-03-11

## 2019-03-11 RX ADMIN — LISINOPRIL 20 MG: 20 TABLET ORAL at 05:41

## 2019-03-11 RX ADMIN — ASPIRIN 325 MG: 325 TABLET ORAL at 05:41

## 2019-03-11 RX ADMIN — ENOXAPARIN SODIUM 40 MG: 100 INJECTION SUBCUTANEOUS at 05:41

## 2019-03-11 RX ADMIN — REGADENOSON 0.4 MG: 0.08 INJECTION, SOLUTION INTRAVENOUS at 09:02

## 2019-03-11 RX ADMIN — LEVOTHYROXINE SODIUM 112 MCG: 112 TABLET ORAL at 05:41

## 2019-03-11 ASSESSMENT — PATIENT HEALTH QUESTIONNAIRE - PHQ9
1. LITTLE INTEREST OR PLEASURE IN DOING THINGS: NOT AT ALL
2. FEELING DOWN, DEPRESSED, IRRITABLE, OR HOPELESS: NOT AT ALL
SUM OF ALL RESPONSES TO PHQ9 QUESTIONS 1 AND 2: 0

## 2019-03-11 NOTE — PROGRESS NOTES
Sleeping, sr with 1st degree av block- no ectopy, call light within reach. To continue to monitor.

## 2019-03-11 NOTE — PROGRESS NOTES
Received in bed, aox4, st hr 104 with 1st degree av block  on monitor. Assessment as per CDU. Call light within reach. Needs attended. Plan of care discussed and understood.

## 2019-03-11 NOTE — PROGRESS NOTES
Lab contacted regarding status on troponin drawn at 1748. Per , barcode did not read properly and results will be available in approx 20 mins.

## 2019-03-11 NOTE — PROGRESS NOTES
Asleep, sr- no ectopy, kept npo for stress test in am, call light within reach. To continue to monitor.

## 2019-03-11 NOTE — DISCHARGE SUMMARY
Discharge Summary    CHIEF COMPLAINT ON ADMISSION  Chief Complaint   Patient presents with   • Sent from Urgent Care       Reason for Admission  WEAKNESS     Admission Date  3/10/2019    CODE STATUS  Full Code    HPI & HOSPITAL COURSE  This is a 68 y.o. male here with chest pain.  Please refer to H&P for detail.  The patient has cardiac workup done with initial troponin and EKG were negative.  He has a stress test this morning and it came back normal.  His chest pain has already resolved.  He was instructed to come back to ER if his symptoms get worse.  He will be following up with his primary care physician as an outpatient.       Therefore, he is discharged in fair and stable condition to home with close outpatient follow-up.        Discharge Date  3/11/19    FOLLOW UP ITEMS POST DISCHARGE      DISCHARGE DIAGNOSES  Active Problems:    Hypothyroidism POA: Yes      Overview: No recent lab results available for review.   Continue with current meds.         Follow up with endocrinologist every 3-6 months.    Pain in the chest POA: Yes    Essential hypertension POA: Unknown  Resolved Problems:    * No resolved hospital problems. *      FOLLOW UP  No future appointments.  David Solis D.O.  [address] [facility] [address]  [phone]    Go on 3/27/2019  Please arrive at 9:45 am for your appointment. Thank you       MEDICATIONS ON DISCHARGE     Medication List      CONTINUE taking these medications      Instructions   lisinopril-hydrochlorothiazide 20-12.5 MG per tablet  Commonly known as:  PRINZIDE, ZESTORETIC   Take 1 Tab by mouth every day.  Dose:  1 Tab     PREVALITE 4 GM/DOSE powder  Generic drug:  cholestyramine light   Take 4 g by mouth every day.  Dose:  4 g     SYNTHROID 112 MCG Tabs  Generic drug:  levothyroxine   Take 112 mcg by mouth every day.  Dose:  112 mcg     therapeutic multivitamin-minerals Tabs   Take 1 Tab by mouth every day.  Dose:  1 Tab            Allergies  No Known  Allergies    DIET  Orders Placed This Encounter   Procedures   • Diet Order Regular     Standing Status:   Standing     Number of Occurrences:   1     Order Specific Question:   Diet:     Answer:   Regular [1]       ACTIVITY  As tolerated.  Weight bearing as tolerated    CONSULTATIONS      PROCEDURES      LABORATORY  Lab Results   Component Value Date    SODIUM 140 03/11/2019    POTASSIUM 3.8 03/11/2019    CHLORIDE 108 03/11/2019    CO2 23 03/11/2019    GLUCOSE 99 03/11/2019    BUN 16 03/11/2019    CREATININE 0.88 03/11/2019    CREATININE 0.9 02/15/2007        Lab Results   Component Value Date    WBC 8.1 03/11/2019    HEMOGLOBIN 15.0 03/11/2019    HEMATOCRIT 45.5 03/11/2019    PLATELETCT 215 03/11/2019        Total time of the discharge process exceeds 38 minutes.

## 2019-03-11 NOTE — DISCHARGE INSTRUCTIONS
Discharge Instructions    Discharged to home by car with relative. Discharged via walking, hospital escort: Refused.  Special equipment needed: Not Applicable    Be sure to schedule a follow-up appointment with your primary care doctor or any specialists as instructed.     Discharge Plan:   Diet Plan: Discussed  Activity Level: Discussed  Confirmed Follow up Appointment: Appointment Scheduled  Confirmed Symptoms Management: Discussed  Medication Reconciliation Updated: Yes  Influenza Vaccine Indication: Not indicated: Previously immunized this influenza season and > 8 years of age    I understand that a diet low in cholesterol, fat, and sodium is recommended for good health. Unless I have been given specific instructions below for another diet, I accept this instruction as my diet prescription.   Other diet: Heart Healthy     Special Instructions: None    · Is patient discharged on Warfarin / Coumadin?   No     Depression / Suicide Risk    As you are discharged from this [facility] facility, it is important to learn how to keep safe from harming yourself.    Recognize the warning signs:  · Abrupt changes in personality, positive or negative- including increase in energy   · Giving away possessions  · Change in eating patterns- significant weight changes-  positive or negative  · Change in sleeping patterns- unable to sleep or sleeping all the time   · Unwillingness or inability to communicate  · Depression  · Unusual sadness, discouragement and loneliness  · Talk of wanting to die  · Neglect of personal appearance   · Rebelliousness- reckless behavior  · Withdrawal from people/activities they love  · Confusion- inability to concentrate     If you or a loved one observes any of these behaviors or has concerns about self-harm, here's what you can do:  · Talk about it- your feelings and reasons for harming yourself  · Remove any means that you might use to hurt yourself (examples: pills, rope, extension cords,  firearm)  · Get professional help from the community (Mental Health, Substance Abuse, psychological counseling)  · Do not be alone:Call your Safe Contact- someone whom you trust who will be there for you.  · Call your local CRISIS HOTLINE 735-9717 or 787-763-0009  · Call your local Children's Mobile Crisis Response Team [place] (059) 545-0867 or www.Experenti  · Call the toll free National Suicide Prevention Hotlines   · National Suicide Prevention Lifeline 915-890-VSYW (6598)  · National Hope Line Network 800-SUICIDE (008-7858)

## 2020-11-03 ENCOUNTER — HOSPITAL ENCOUNTER (OUTPATIENT)
Dept: LAB | Facility: MEDICAL CENTER | Age: 70
End: 2020-11-03
Attending: PHYSICIAN ASSISTANT
Payer: MEDICARE

## 2020-11-03 LAB
T4 FREE SERPL-MCNC: 1.79 NG/DL (ref 0.93–1.7)
TSH SERPL DL<=0.005 MIU/L-ACNC: 0.42 UIU/ML (ref 0.38–5.33)

## 2020-11-03 PROCEDURE — 84439 ASSAY OF FREE THYROXINE: CPT

## 2020-11-03 PROCEDURE — 84443 ASSAY THYROID STIM HORMONE: CPT

## 2020-11-03 PROCEDURE — 36415 COLL VENOUS BLD VENIPUNCTURE: CPT

## 2021-01-15 DIAGNOSIS — Z23 NEED FOR VACCINATION: ICD-10-CM

## 2021-02-04 ENCOUNTER — HOSPITAL ENCOUNTER (OUTPATIENT)
Dept: LAB | Facility: MEDICAL CENTER | Age: 71
End: 2021-02-04
Attending: FAMILY MEDICINE
Payer: MEDICARE

## 2021-02-04 LAB
ALBUMIN SERPL BCP-MCNC: 4.7 G/DL (ref 3.2–4.9)
ALBUMIN/GLOB SERPL: 1.6 G/DL
ALP SERPL-CCNC: 89 U/L (ref 30–99)
ALT SERPL-CCNC: 25 U/L (ref 2–50)
ANION GAP SERPL CALC-SCNC: 15 MMOL/L (ref 7–16)
AST SERPL-CCNC: 28 U/L (ref 12–45)
BILIRUB SERPL-MCNC: 0.8 MG/DL (ref 0.1–1.5)
BUN SERPL-MCNC: 14 MG/DL (ref 8–22)
CALCIUM SERPL-MCNC: 9.7 MG/DL (ref 8.5–10.5)
CHLORIDE SERPL-SCNC: 104 MMOL/L (ref 96–112)
CHOLEST SERPL-MCNC: 138 MG/DL (ref 100–199)
CO2 SERPL-SCNC: 24 MMOL/L (ref 20–33)
CREAT SERPL-MCNC: 0.77 MG/DL (ref 0.5–1.4)
EST. AVERAGE GLUCOSE BLD GHB EST-MCNC: 117 MG/DL
GLOBULIN SER CALC-MCNC: 2.9 G/DL (ref 1.9–3.5)
GLUCOSE SERPL-MCNC: 77 MG/DL (ref 65–99)
HBA1C MFR BLD: 5.7 % (ref 0–5.6)
HDLC SERPL-MCNC: 37 MG/DL
LDLC SERPL CALC-MCNC: 78 MG/DL
POTASSIUM SERPL-SCNC: 4.5 MMOL/L (ref 3.6–5.5)
PROT SERPL-MCNC: 7.6 G/DL (ref 6–8.2)
SODIUM SERPL-SCNC: 143 MMOL/L (ref 135–145)
TRIGL SERPL-MCNC: 117 MG/DL (ref 0–149)

## 2021-02-04 PROCEDURE — 80061 LIPID PANEL: CPT

## 2021-02-04 PROCEDURE — 83036 HEMOGLOBIN GLYCOSYLATED A1C: CPT

## 2021-02-04 PROCEDURE — 80053 COMPREHEN METABOLIC PANEL: CPT

## 2021-02-04 PROCEDURE — 36415 COLL VENOUS BLD VENIPUNCTURE: CPT

## 2021-02-06 ENCOUNTER — OFFICE VISIT (OUTPATIENT)
Dept: URGENT CARE | Facility: CLINIC | Age: 71
End: 2021-02-06
Payer: MEDICARE

## 2021-02-06 VITALS
SYSTOLIC BLOOD PRESSURE: 138 MMHG | TEMPERATURE: 98.9 F | HEART RATE: 80 BPM | RESPIRATION RATE: 16 BRPM | OXYGEN SATURATION: 96 % | BODY MASS INDEX: 28 KG/M2 | HEIGHT: 71 IN | WEIGHT: 200 LBS | DIASTOLIC BLOOD PRESSURE: 70 MMHG

## 2021-02-06 DIAGNOSIS — K05.10 GINGIVITIS: ICD-10-CM

## 2021-02-06 DIAGNOSIS — K04.7 DENTAL INFECTION: ICD-10-CM

## 2021-02-06 PROCEDURE — 99214 OFFICE O/P EST MOD 30 MIN: CPT | Performed by: NURSE PRACTITIONER

## 2021-02-06 RX ORDER — CHLORHEXIDINE GLUCONATE ORAL RINSE 1.2 MG/ML
15 SOLUTION DENTAL 2 TIMES DAILY
Qty: 473 ML | Refills: 0 | Status: SHIPPED | OUTPATIENT
Start: 2021-02-06 | End: 2023-03-16

## 2021-02-06 RX ORDER — AMOXICILLIN 500 MG/1
500 CAPSULE ORAL 2 TIMES DAILY
Qty: 20 CAP | Refills: 0 | Status: SHIPPED | OUTPATIENT
Start: 2021-02-06 | End: 2021-02-16

## 2021-02-06 ASSESSMENT — ENCOUNTER SYMPTOMS
FEVER: 0
SORE THROAT: 0
VOMITING: 0
MYALGIAS: 0
CHILLS: 0
EYE REDNESS: 0
DIZZINESS: 0
NAUSEA: 0
SHORTNESS OF BREATH: 0

## 2021-02-06 ASSESSMENT — FIBROSIS 4 INDEX: FIB4 SCORE: 1.85

## 2021-02-07 NOTE — PROGRESS NOTES
Subjective:   Deepak Macdonald is a 70 y.o. male who presents for Dental Pain (infection, x2 days )      Dental Pain   This is a new problem. Episode onset: 2 days. The problem occurs constantly. The problem has been gradually worsening. The pain is at a severity of 10/10. The pain is severe. Pertinent negatives include no difficulty swallowing, facial pain, fever or oral bleeding. Associated symptoms comments: Gum swelling and pain  . He has tried acetaminophen for the symptoms. The treatment provided no relief.       Review of Systems   Constitutional: Negative for chills and fever.   HENT: Negative for sore throat.         Dental pain, gum pain     Eyes: Negative for redness.   Respiratory: Negative for shortness of breath.    Cardiovascular: Negative for chest pain.   Gastrointestinal: Negative for nausea and vomiting.   Genitourinary: Negative for dysuria.   Musculoskeletal: Negative for myalgias.   Skin: Negative for rash.   Neurological: Negative for dizziness.       Medications:    • amoxicillin Caps  • chlorhexidine Soln  • cholestyramine light  • lisinopril-hydrochlorothiazide  • metFORMIN Tabs  • Synthroid Tabs  • therapeutic multivitamin-minerals Tabs    Allergies: Patient has no known allergies.    Problem List: Deepak Macdonald has Calculus of kidney; BMI 30.0-30.9,adult; Hypothyroidism; Dyslipidemia; History of lymphoma; Pain in the chest; and Essential hypertension on their problem list.    Surgical History:  Past Surgical History:   Procedure Laterality Date   • CYSTOSCOPY  9/16/2015    Procedure: CYSTOSCOPY;  Surgeon: Sd Khanna M.D.;  Location: [facility];  Service:    • URETEROSCOPY Right 9/16/2015    Procedure: URETEROSCOPY;  Surgeon: Sd Khanna M.D.;  Location: [facility];  Service:    • LASERTRIPSY Right 9/16/2015    Procedure: LASERTRIPSY LITHO;  Surgeon: Sd Khanna M.D.;  Location: [facility];  Service:    • THYROIDECTOMY  2007   •  "OTHER      lymph nodes removed       Past Social Hx: Deepak Macdonald  reports that he has never smoked. He has never used smokeless tobacco. He reports that he does not drink alcohol or use drugs.     Past Family Hx:  Deepak Macdonald family history includes Cancer in his sister.     Problem list, medications, and allergies reviewed by myself today in Epic.     Objective:     /70   Pulse 80   Temp 37.2 °C (98.9 °F) (Temporal)   Resp 16   Ht 1.803 m (5' 11\")   Wt 90.7 kg (200 lb)   SpO2 96%   BMI 27.89 kg/m²     Physical Exam  Constitutional:       Appearance: Normal appearance. He is not ill-appearing or toxic-appearing.   HENT:      Head: Normocephalic.      Right Ear: External ear normal.      Left Ear: External ear normal.      Nose: Nose normal.      Mouth/Throat:      Lips: Pink.      Mouth: Mucous membranes are moist.      Dentition: Abnormal dentition. Dental tenderness, gingival swelling and dental caries present. No dental abscesses or gum lesions.      Pharynx: Oropharynx is clear.   Eyes:      General: Lids are normal.         Right eye: No discharge.         Left eye: No discharge.   Neck:      Musculoskeletal: Full passive range of motion without pain.   Pulmonary:      Effort: Pulmonary effort is normal. No accessory muscle usage or respiratory distress.   Musculoskeletal: Normal range of motion.   Skin:     Coloration: Skin is not pale.   Neurological:      Mental Status: He is alert and oriented to person, place, and time.   Psychiatric:         Mood and Affect: Mood normal.         Thought Content: Thought content normal.         Assessment/Plan:     Diagnosis and associated orders:     1. Dental infection  chlorhexidine (PERIDEX) 0.12 % Solution    amoxicillin (AMOXIL) 500 MG Cap   2. Gingivitis  chlorhexidine (PERIDEX) 0.12 % Solution    amoxicillin (AMOXIL) 500 MG Cap      Comments/MDM:     • Patient 70-year-old male present with the stated above, patient with poor dentition I " will start him on amoxicillin for infectious etiology line also erythematous will trial oral Peridex.  Strongly encourage patient to follow-up with the dentist soon as possible for the evaluation and management as he will likely need multiple teeth extracted.. Use over-the-counter pain reliever, such as acetaminophen (Tylenol), ibuprofen (Advil, Motrin) or naproxen (Aleve) as needed; follow package directions for dosing.  Differential diagnosis, natural history, supportive care, and indications for immediate follow-up discussed.       Please note that this dictation was created using voice recognition software. I have made a reasonable attempt to correct obvious errors, but I expect that there are errors of grammar and possibly content that I did not discover before finalizing the note.    This note was electronically signed by Ranjan MORALES.

## 2021-03-06 NOTE — ASSESSMENT & PLAN NOTE
Chest Pain ruleout  - Troponin and EKG were unremarkable in the ER.  - The patient will be monitored on telemetry and troponins will be trended  - lipid panel will be checked.   - I started on aspirin 325 mg qday  - NPO  - stress test in the morning   Problem: Falls - Risk of:  Goal: Will remain free from falls  Description: Will remain free from falls  3/6/2021 1714 by Sekou Rosa RN  Outcome: Ongoing   No falls this shift. Problem: Falls - Risk of:  Goal: Absence of physical injury  Description: Absence of physical injury  3/6/2021 1714 by Sekou Rosa RN  Outcome: Ongoing   No injury this shift. Problem: Pain:  Goal: Pain level will decrease  Description: Pain level will decrease  3/6/2021 1714 by Sekou Rosa RN  Outcome: Ongoing   Pt currently denies pain. Will continue to monitor. Problem: Musculor/Skeletal Functional Status  Goal: Highest potential functional level  3/6/2021 1714 by Sekou Rosa RN  Outcome: Ongoing   Pt up with standby today with walker. Monitoring. Problem: Musculor/Skeletal Functional Status  Goal: Absence of falls  3/6/2021 1714 by Sekou Rosa RN  Outcome: Ongoing   No falls this shift. Problem: Nutrition  Goal: Optimal nutrition therapy  3/6/2021 1714 by Sekou Rosa RN  Outcome: Ongoing   Pt tolerating po intake this shift. Problem: Skin Integrity:  Goal: Will show no infection signs and symptoms  Description: Will show no infection signs and symptoms  3/6/2021 1714 by Sekou Rosa RN  Outcome: Ongoing   No current s/s of infection noted this shift. Problem: Skin Integrity:  Goal: Absence of new skin breakdown  Description: Absence of new skin breakdown  3/6/2021 1714 by Sekou Rosa RN  Outcome: Ongoing   No new altered skin this shift.

## 2022-08-26 ENCOUNTER — HOSPITAL ENCOUNTER (EMERGENCY)
Facility: MEDICAL CENTER | Age: 72
End: 2022-08-27
Attending: EMERGENCY MEDICINE
Payer: MEDICARE

## 2022-08-26 DIAGNOSIS — M25.512 ACUTE PAIN OF LEFT SHOULDER: ICD-10-CM

## 2022-08-26 DIAGNOSIS — M79.602 PAIN OF LEFT UPPER EXTREMITY: ICD-10-CM

## 2022-08-26 DIAGNOSIS — M54.12 BRACHIAL NEURALGIA: ICD-10-CM

## 2022-08-26 PROCEDURE — 99284 EMERGENCY DEPT VISIT MOD MDM: CPT

## 2022-08-26 ASSESSMENT — PAIN DESCRIPTION - DESCRIPTORS: DESCRIPTORS: ACHING

## 2022-08-27 ENCOUNTER — APPOINTMENT (OUTPATIENT)
Dept: RADIOLOGY | Facility: MEDICAL CENTER | Age: 72
End: 2022-08-27
Attending: EMERGENCY MEDICINE
Payer: MEDICARE

## 2022-08-27 VITALS
HEART RATE: 68 BPM | RESPIRATION RATE: 18 BRPM | HEIGHT: 71 IN | OXYGEN SATURATION: 94 % | WEIGHT: 201.06 LBS | SYSTOLIC BLOOD PRESSURE: 137 MMHG | DIASTOLIC BLOOD PRESSURE: 90 MMHG | TEMPERATURE: 98.4 F | BODY MASS INDEX: 28.15 KG/M2

## 2022-08-27 LAB
ALBUMIN SERPL BCP-MCNC: 4.6 G/DL (ref 3.2–4.9)
ALBUMIN/GLOB SERPL: 2 G/DL
ALP SERPL-CCNC: 74 U/L (ref 30–99)
ALT SERPL-CCNC: 37 U/L (ref 2–50)
ANION GAP SERPL CALC-SCNC: 12 MMOL/L (ref 7–16)
AST SERPL-CCNC: 33 U/L (ref 12–45)
BASOPHILS # BLD AUTO: 0.5 % (ref 0–1.8)
BASOPHILS # BLD: 0.03 K/UL (ref 0–0.12)
BILIRUB SERPL-MCNC: 1.2 MG/DL (ref 0.1–1.5)
BUN SERPL-MCNC: 18 MG/DL (ref 8–22)
CALCIUM SERPL-MCNC: 8.9 MG/DL (ref 8.4–10.2)
CHLORIDE SERPL-SCNC: 105 MMOL/L (ref 96–112)
CO2 SERPL-SCNC: 21 MMOL/L (ref 20–33)
CREAT SERPL-MCNC: 0.71 MG/DL (ref 0.5–1.4)
EKG IMPRESSION: NORMAL
EOSINOPHIL # BLD AUTO: 0.1 K/UL (ref 0–0.51)
EOSINOPHIL NFR BLD: 1.6 % (ref 0–6.9)
ERYTHROCYTE [DISTWIDTH] IN BLOOD BY AUTOMATED COUNT: 46 FL (ref 35.9–50)
GFR SERPLBLD CREATININE-BSD FMLA CKD-EPI: 97 ML/MIN/1.73 M 2
GLOBULIN SER CALC-MCNC: 2.3 G/DL (ref 1.9–3.5)
GLUCOSE SERPL-MCNC: 107 MG/DL (ref 65–99)
HCT VFR BLD AUTO: 43.2 % (ref 42–52)
HGB BLD-MCNC: 15 G/DL (ref 14–18)
IMM GRANULOCYTES # BLD AUTO: 0.02 K/UL (ref 0–0.11)
IMM GRANULOCYTES NFR BLD AUTO: 0.3 % (ref 0–0.9)
LIPASE SERPL-CCNC: 22 U/L (ref 7–58)
LYMPHOCYTES # BLD AUTO: 2.15 K/UL (ref 1–4.8)
LYMPHOCYTES NFR BLD: 34 % (ref 22–41)
MCH RBC QN AUTO: 30.6 PG (ref 27–33)
MCHC RBC AUTO-ENTMCNC: 34.7 G/DL (ref 33.7–35.3)
MCV RBC AUTO: 88.2 FL (ref 81.4–97.8)
MONOCYTES # BLD AUTO: 0.56 K/UL (ref 0–0.85)
MONOCYTES NFR BLD AUTO: 8.8 % (ref 0–13.4)
NEUTROPHILS # BLD AUTO: 3.47 K/UL (ref 1.82–7.42)
NEUTROPHILS NFR BLD: 54.8 % (ref 44–72)
NRBC # BLD AUTO: 0 K/UL
NRBC BLD-RTO: 0 /100 WBC
PLATELET # BLD AUTO: 173 K/UL (ref 164–446)
PMV BLD AUTO: 11.6 FL (ref 9–12.9)
POTASSIUM SERPL-SCNC: 4.1 MMOL/L (ref 3.6–5.5)
PROT SERPL-MCNC: 6.9 G/DL (ref 6–8.2)
RBC # BLD AUTO: 4.9 M/UL (ref 4.7–6.1)
SODIUM SERPL-SCNC: 138 MMOL/L (ref 135–145)
TROPONIN T SERPL-MCNC: 7 NG/L (ref 6–19)
WBC # BLD AUTO: 6.3 K/UL (ref 4.8–10.8)

## 2022-08-27 PROCEDURE — 85025 COMPLETE CBC W/AUTO DIFF WBC: CPT

## 2022-08-27 PROCEDURE — 71045 X-RAY EXAM CHEST 1 VIEW: CPT

## 2022-08-27 PROCEDURE — A9270 NON-COVERED ITEM OR SERVICE: HCPCS | Performed by: EMERGENCY MEDICINE

## 2022-08-27 PROCEDURE — 80053 COMPREHEN METABOLIC PANEL: CPT

## 2022-08-27 PROCEDURE — 84484 ASSAY OF TROPONIN QUANT: CPT

## 2022-08-27 PROCEDURE — 700102 HCHG RX REV CODE 250 W/ 637 OVERRIDE(OP): Performed by: EMERGENCY MEDICINE

## 2022-08-27 PROCEDURE — 83690 ASSAY OF LIPASE: CPT

## 2022-08-27 PROCEDURE — 93005 ELECTROCARDIOGRAM TRACING: CPT | Performed by: EMERGENCY MEDICINE

## 2022-08-27 PROCEDURE — 73030 X-RAY EXAM OF SHOULDER: CPT | Mod: LT

## 2022-08-27 PROCEDURE — 36415 COLL VENOUS BLD VENIPUNCTURE: CPT

## 2022-08-27 RX ORDER — METHOCARBAMOL 750 MG/1
750 TABLET, FILM COATED ORAL 3 TIMES DAILY
Qty: 30 TABLET | Refills: 0 | Status: SHIPPED | OUTPATIENT
Start: 2022-08-27 | End: 2022-09-06

## 2022-08-27 RX ORDER — METHOCARBAMOL 500 MG/1
1000 TABLET, FILM COATED ORAL ONCE
Status: COMPLETED | OUTPATIENT
Start: 2022-08-27 | End: 2022-08-27

## 2022-08-27 RX ORDER — NAPROXEN 500 MG/1
500 TABLET ORAL 2 TIMES DAILY WITH MEALS
Qty: 10 TABLET | Refills: 0 | Status: SHIPPED | OUTPATIENT
Start: 2022-08-27 | End: 2022-09-01

## 2022-08-27 RX ORDER — ASPIRIN 325 MG
325 TABLET ORAL ONCE
Status: COMPLETED | OUTPATIENT
Start: 2022-08-27 | End: 2022-08-27

## 2022-08-27 RX ORDER — ACETAMINOPHEN 325 MG/1
650 TABLET ORAL EVERY 6 HOURS PRN
Qty: 30 TABLET | Refills: 0 | Status: SHIPPED | OUTPATIENT
Start: 2022-08-27 | End: 2022-09-01

## 2022-08-27 RX ADMIN — ASPIRIN 325 MG ORAL TABLET 325 MG: 325 PILL ORAL at 00:49

## 2022-08-27 RX ADMIN — METHOCARBAMOL 1000 MG: 500 TABLET ORAL at 00:48

## 2022-08-27 NOTE — ED TRIAGE NOTES
"Pt here with c/o    Chief Complaint   Patient presents with    Shoulder Pain     Pt states he got a sudden pain in his left scapular area this morning, pain comes and goes with movement and now radiates to left arm       BP (!) 158/87   Pulse 75   Temp 36.6 °C (97.9 °F) (Temporal)   Resp 18   Ht 1.803 m (5' 11\")   Wt 91.2 kg (201 lb 1 oz)   SpO2 97%   BMI 28.04 kg/m²    "

## 2022-08-27 NOTE — ED PROVIDER NOTES
ED Provider Note    CHIEF COMPLAINT  Chief Complaint   Patient presents with    Shoulder Pain     Pt states he got a sudden pain in his left scapular area this morning, pain comes and goes with movement and now radiates to left arm       HPI  Mr. Macdonald is a 71-year-old male with history of hypertension, diabetes, dyslipidemia who presents emergency room for concerns regarding left-sided shoulder discomfort.  Patient initially started with pain in the left area of his scapula that started several days ago and has been associated with moving heavy appliances of late.  This pain has been occasionally exacerbated by movement and over the course of several days leading to yesterday morning he began having some fullness and discomfort sensations in the left arm.  He finds this very difficult to describe and says it is an odd sensation occasionally with tingling.  He has not had any exertional chest discomfort, denies any shortness of breath, nauseousness or diaphoresis.  He states that he may have been told in the past that he had a remote episode with his heart but has been only on medications and has no prior history of stent or hospitalization for an acute MI.  He is fairly active, denies any exertional symptomology of late.  He currently is wearing a lidocaine patch over the left scapula and has had some alleviation of the pain and discomfort since.    PPE Note: I personally donned full PPE for all patient encounters during this visit, including being clean-shaven with an N95 respirator mask, gloves, and goggles.       REVIEW OF SYSTEMS  See HPI for further details. All other systems are negative.     PAST MEDICAL HISTORY   has a past medical history of cancer (2004), Cancer (HCC) (2007), Depression, Heart burn, High cholesterol, Kidney stones, Lymphoma (HCC), Snoring, and Thyroid disease.    SOCIAL HISTORY  Social History     Tobacco Use    Smoking status: Never    Smokeless tobacco: Never   Substance and Sexual  "Activity    Alcohol use: No     Comment: quit drinking about a year ago    Drug use: No    Sexual activity: Yes     Partners: Female       SURGICAL HISTORY   has a past surgical history that includes thyroidectomy (2007); other; cystoscopy (9/16/2015); ureteroscopy (Right, 9/16/2015); and lasertripsy (Right, 9/16/2015).    CURRENT MEDICATIONS  Home Medications       Reviewed by Marian Wells R.N. (Registered Nurse) on 08/26/22 at 2344  Med List Status: Not Addressed     Medication Last Dose Status   chlorhexidine (PERIDEX) 0.12 % Solution  Active   cholestyramine light (PREVALITE) 4 GM/DOSE powder  Active   lisinopril-hydrochlorothiazide (PRINZIDE, ZESTORETIC) 20-12.5 MG per tablet  Active   metFORMIN (GLUCOPHAGE) 500 MG Tab  Active   SYNTHROID 112 MCG Tab  Active   therapeutic multivitamin-minerals (THERAGRAN-M) Tab  Active                  ALLERGIES  No Known Allergies    PHYSICAL EXAM  VITAL SIGNS: BP (!) 137/90   Pulse 68   Temp 36.9 °C (98.4 °F)   Resp 18   Ht 1.803 m (5' 11\")   Wt 91.2 kg (201 lb 1 oz)   SpO2 94%   BMI 28.04 kg/m²   Pulse ox interpretation: I interpret this pulse ox as normal.  Genl: M sitting in gurney comfortably, speaking clearly, appears in no acute distress   Head: NC/AT   ENT: Mucous membranes moist, posterior pharynx clear, uvula midline, nares patent bilaterally   Eyes: Normal sclera, pupils equal round reactive to light  Neck: Supple, FROM, no LAD appreciated   Pulmonary: Lungs are clear to auscultation bilaterally  Chest: No TTP  CV:  RRR, no murmur appreciated, pulses 2+ in both upper and lower extremities,  Abdomen: soft, NT/ND; no rebound/guarding, no masses palpated, no HSM   : no CVA or suprapubic tenderness   Musculoskeletal: Pain free ROM of the neck. Moving upper and lower extremities and spontaneous in coordinated fashion  Left Upper Extremity:  - Skin: No abrasions, no lacerations, no ecchymosis.  Tenderness is easily elicitation with palpation of the " supraspinatus muscle groups and the body of the scapula.  No crepitus, no skin changes, no pain with manipulation of the joint or long bones of the extremity.  - Motor: Full ROM at shoulder, elbow, wrist; 5/5 wrist flexion/extension, thumb IP joint flexion/extension (AIN/PIN), abduction/adduction (ulnar) intact  - Sensation intact to median/ulnar/radial nerves  - 2+ radial pulse, < 2 sec cap refill x 5 digits    DIAGNOSTIC STUDIES / PROCEDURES    EKG  Results for orders placed or performed during the hospital encounter of 22   EKG (NOW)   Result Value Ref Range    Report       [facility] of the [facility] Emergency Dept.    Test Date:  2022  Pt Name:    RIK ELIZONDO                  Department: [facility]  MRN:        [id number]                      Room:       [facility]  Gender:     Male                         Technician: JEAN-PIERRE  :        1950                   Requested By:JAKOB SHAIKH  Order #:    [id number]                    Reading MD: Allen Martinez MD    Measurements  Intervals                                Axis  Rate:       67                           P:          -62  AZ:         210                          QRS:        -57  QRSD:       111                          T:          24  QT:         409  QTc:        432    Interpretive Statements  Sinus rhythm, rate of 67, pr slightly prolonged at 210ms, normal axis, no  acute  ischemia or infarction.  unchanged from prior dated 3/10/19  Electronically Signed On 2022 2:19:15 PDT by Allen Martinez MD       LABS  Labs Reviewed   COMP METABOLIC PANEL - Abnormal; Notable for the following components:       Result Value    Glucose 107 (*)     All other components within normal limits   CBC WITH DIFFERENTIAL   TROPONIN   LIPASE   ESTIMATED GFR     RADIOLOGY  DX-SHOULDER 2+ LEFT   Final Result         1.  No acute traumatic bony injury.   2.  Atherosclerosis      DX-CHEST-PORTABLE (1 VIEW)   Final Result         1.  No acute cardiopulmonary disease.    2.  Atherosclerosis        COURSE & MEDICAL DECISION MAKING  Pertinent Labs & Imaging studies reviewed. (See chart for details)    DDX:  Overuse injury, cervical radiculopathy, myalgia, atypical ACS, osteoarthritis,    MDM  Initial evaluation at 1207:  Patient is seen and evaluated for symptoms as described above.  The patient describes having some arm discomfort and shoulder discomfort that is several days in duration and associated with overuse injury.  He has had some alleviation of pain with lidocaine patch.  However he has this nonspecific left-sided arm tingling sensation and motor of the clinically has no signs of acute arterial obstruction, no vascular insufficiency, no appreciable weakness.  Concern from family members is a possibility of heart attack.  The patient has not had any exertional symptomology and with symptoms of been going on for greater than 24 hours EKG, chest x-ray and troponin were obtained.    Lab work shows no evidence of concerning anemia, no leukocytosis, no gross electrolyte abnormalities or contributing metabolic derangements.  Troponin is not elevated and based on the duration of his symptoms it is unlikely to be ACS.  Shoulder x-ray shows no lytic lesions, no masses, no other concerning bony process for his disease.  Chest x-ray without any cardiomegaly or other concerning cardiopulmonary findings.    EKG is without any acute ischemia or infarction    The patient will be treated for a musculoskeletal source of his shoulder discomfort and is counseled regarding the likelihood of this muscle injury and overuse injury likely leading to a small brachial plexopathy.  These are neuropraxia's that are reversible and is being treated with rest, ice, elevation and symptomatic Robaxin.  Questions are addressed and he is discharged home in stable condition.    FINAL IMPRESSION  Visit Diagnoses     ICD-10-CM   1. Acute pain of left shoulder  M25.512   2. Pain of left upper extremity  M79.602    3. 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

## 2022-08-27 NOTE — ED NOTES
DC instructions reviewed. Verbalizes understanding and denies further need. Ambulated to exit with a steady gait with son.

## 2022-08-27 NOTE — ED NOTES
Patient was suppose to get a xray outpatient and the pain continued to worsen so he came in with his son.

## 2023-03-16 ENCOUNTER — OFFICE VISIT (OUTPATIENT)
Dept: MEDICAL GROUP | Facility: PHYSICIAN GROUP | Age: 73
End: 2023-03-16
Payer: MEDICARE

## 2023-03-16 VITALS
WEIGHT: 192 LBS | BODY MASS INDEX: 26.88 KG/M2 | SYSTOLIC BLOOD PRESSURE: 110 MMHG | HEIGHT: 71 IN | TEMPERATURE: 97.4 F | HEART RATE: 74 BPM | OXYGEN SATURATION: 95 % | DIASTOLIC BLOOD PRESSURE: 54 MMHG

## 2023-03-16 DIAGNOSIS — E89.0 POSTOPERATIVE HYPOTHYROIDISM: ICD-10-CM

## 2023-03-16 DIAGNOSIS — R73.03 PREDIABETES: ICD-10-CM

## 2023-03-16 DIAGNOSIS — Z12.5 SCREENING FOR MALIGNANT NEOPLASM OF PROSTATE: ICD-10-CM

## 2023-03-16 DIAGNOSIS — I10 ESSENTIAL HYPERTENSION: ICD-10-CM

## 2023-03-16 DIAGNOSIS — E78.5 DYSLIPIDEMIA: ICD-10-CM

## 2023-03-16 PROCEDURE — 99214 OFFICE O/P EST MOD 30 MIN: CPT | Performed by: FAMILY MEDICINE

## 2023-03-16 RX ORDER — ATORVASTATIN CALCIUM 20 MG/1
20 TABLET, FILM COATED ORAL DAILY
COMMUNITY
Start: 2023-03-02 | End: 2023-03-16 | Stop reason: SDUPTHER

## 2023-03-16 RX ORDER — ATORVASTATIN CALCIUM 20 MG/1
20 TABLET, FILM COATED ORAL DAILY
Qty: 90 TABLET | Refills: 3 | Status: SHIPPED | OUTPATIENT
Start: 2023-03-16

## 2023-03-16 RX ORDER — LISINOPRIL AND HYDROCHLOROTHIAZIDE 20; 12.5 MG/1; MG/1
1 TABLET ORAL DAILY
Qty: 90 TABLET | Refills: 3 | Status: SHIPPED | OUTPATIENT
Start: 2023-03-16

## 2023-03-16 ASSESSMENT — FIBROSIS 4 INDEX: FIB4 SCORE: 2.29

## 2023-03-16 ASSESSMENT — PATIENT HEALTH QUESTIONNAIRE - PHQ9: CLINICAL INTERPRETATION OF PHQ2 SCORE: 0

## 2023-03-16 NOTE — PROGRESS NOTES
"Subjective:     Chief Complaint   Patient presents with    Establish Care       HPI:   Deepak presents today to discuss the following.    Essential hypertension  Chronic issue  On prinzide 20/12.5mg once daily       Prediabetes  Chronic issue  On metformin 500mg BID    Hypothyroidism  Chronic issue  Takes synthroid 112mcg    Dyslipidemia  Chronic issue  On statin     Past Medical History:   Diagnosis Date    cancer 2004    lymphoma    Cancer (HCC) 2007    thyroid    Depression     Heart burn     High cholesterol     Kidney stones     Lymphoma (HCC)     2004    Snoring     Thyroid disease        Current Outpatient Medications Ordered in Epic   Medication Sig Dispense Refill    metFORMIN (GLUCOPHAGE) 500 MG Tab Take 1 Tablet by mouth 2 times a day with meals. 180 Tablet 3    lisinopril-hydrochlorothiazide (PRINZIDE) 20-12.5 MG per tablet Take 1 Tablet by mouth every day. 90 Tablet 3    atorvastatin (LIPITOR) 20 MG Tab Take 1 Tablet by mouth every day. 90 Tablet 3    therapeutic multivitamin-minerals (THERAGRAN-M) Tab Take 1 Tab by mouth every day.      SYNTHROID 112 MCG Tab Take 112 mcg by mouth every day.       No current [facility]-ordered facility-administered medications on file.       Allergies:  Patient has no known allergies.    Health Maintenance: Completed    ROS:  Gen: no fevers/chills, no changes in weight  Eyes: no changes in vision  Pulm: no sob, no cough  CV: no chest pain, no palpitations  GI: no nausea/vomiting, no diarrhea      Objective:     Exam:  /54 (BP Location: Left arm, Patient Position: Sitting, BP Cuff Size: Adult)   Pulse 74   Temp 36.3 °C (97.4 °F) (Temporal)   Ht 1.803 m (5' 11\")   Wt 87.1 kg (192 lb)   SpO2 95%   BMI 26.78 kg/m²  Body mass index is 26.78 kg/m².    Gen: Alert and oriented, No apparent distress.  Eyes: PERRLA  Lungs: Normal effort, CTA bilaterally, no wheezes, rhonchi, or rales  CV: Regular rate and rhythm. No murmurs, rubs, or gallops.  Ext: No clubbing, cyanosis, " edema.  Skin: no rash, lesions or ulcers  Neuro: Moves all extremities.  Psych: AAOx3        Assessment & Plan:     72 y.o. male with the following -     1. Essential hypertension  Chronic, stable condition.  Continue with Prinzide.    2. Prediabetes  Chronic, stable condition.  Continue with metformin.  Due for repeat labs.  - CBC WITHOUT DIFFERENTIAL; Future  - Basic Metabolic Panel; Future  - HEMOGLOBIN A1C; Future  - Lipid Profile; Future  - PROSTATE SPECIFIC AG SCREENING; Future    3. Postoperative hypothyroidism  Chronic, stable condition.  Continue to follow-up with endocrinology.  Continue with Synthroid.    4. Dyslipidemia  Chronic, stable condition.  Continue with statin.  Due for repeat labs.  - CBC WITHOUT DIFFERENTIAL; Future  - Basic Metabolic Panel; Future  - HEMOGLOBIN A1C; Future  - Lipid Profile; Future  - PROSTATE SPECIFIC AG SCREENING; Future    5. Screening for malignant neoplasm of prostate  - PROSTATE SPECIFIC AG SCREENING; Future      Return in about 3 months (around 6/16/2023).          Please note that this dictation was created using voice recognition software. I have made every reasonable attempt to correct obvious errors, but I expect that there are errors of grammar and possibly content that I did not discover before finalizing the note.

## 2023-06-10 LAB
BASOPHILS # BLD AUTO: 0.1 X10E3/UL (ref 0–0.2)
BASOPHILS NFR BLD AUTO: 1 %
BUN SERPL-MCNC: 17 MG/DL (ref 8–27)
BUN/CREAT SERPL: 19 (ref 10–24)
CALCIUM SERPL-MCNC: 9.1 MG/DL (ref 8.6–10.2)
CHLORIDE SERPL-SCNC: 102 MMOL/L (ref 96–106)
CHOLEST SERPL-MCNC: 124 MG/DL (ref 100–199)
CO2 SERPL-SCNC: 23 MMOL/L (ref 20–29)
CREAT SERPL-MCNC: 0.89 MG/DL (ref 0.76–1.27)
EGFRCR SERPLBLD CKD-EPI 2021: 90 ML/MIN/1.73
EOSINOPHIL # BLD AUTO: 0.1 X10E3/UL (ref 0–0.4)
EOSINOPHIL NFR BLD AUTO: 2 %
ERYTHROCYTE [DISTWIDTH] IN BLOOD BY AUTOMATED COUNT: 13.2 % (ref 11.6–15.4)
GLUCOSE SERPL-MCNC: 92 MG/DL (ref 70–99)
HBA1C MFR BLD: 5.8 % (ref 4.8–5.6)
HCT VFR BLD AUTO: 43.4 % (ref 37.5–51)
HDLC SERPL-MCNC: 37 MG/DL
HGB BLD-MCNC: 14.8 G/DL (ref 13–17.7)
IMM GRANULOCYTES # BLD AUTO: 0 X10E3/UL (ref 0–0.1)
IMM GRANULOCYTES NFR BLD AUTO: 0 %
IMMATURE CELLS  115398: NORMAL
LABORATORY COMMENT REPORT: ABNORMAL
LDLC SERPL CALC-MCNC: 67 MG/DL (ref 0–99)
LYMPHOCYTES # BLD AUTO: 2.9 X10E3/UL (ref 0.7–3.1)
LYMPHOCYTES NFR BLD AUTO: 44 %
MCH RBC QN AUTO: 32.2 PG (ref 26.6–33)
MCHC RBC AUTO-ENTMCNC: 34.1 G/DL (ref 31.5–35.7)
MCV RBC AUTO: 94 FL (ref 79–97)
MONOCYTES # BLD AUTO: 0.5 X10E3/UL (ref 0.1–0.9)
MONOCYTES NFR BLD AUTO: 8 %
MORPHOLOGY BLD-IMP: NORMAL
NEUTROPHILS # BLD AUTO: 2.9 X10E3/UL (ref 1.4–7)
NEUTROPHILS NFR BLD AUTO: 45 %
NRBC BLD AUTO-RTO: NORMAL %
PLATELET # BLD AUTO: 164 X10E3/UL (ref 150–450)
POTASSIUM SERPL-SCNC: 4.2 MMOL/L (ref 3.5–5.2)
PSA SERPL-MCNC: 1.7 NG/ML (ref 0–4)
RBC # BLD AUTO: 4.6 X10E6/UL (ref 4.14–5.8)
SODIUM SERPL-SCNC: 138 MMOL/L (ref 134–144)
TRIGL SERPL-MCNC: 109 MG/DL (ref 0–149)
VLDLC SERPL CALC-MCNC: 20 MG/DL (ref 5–40)
WBC # BLD AUTO: 6.5 X10E3/UL (ref 3.4–10.8)

## 2023-06-16 ENCOUNTER — OFFICE VISIT (OUTPATIENT)
Dept: MEDICAL GROUP | Facility: PHYSICIAN GROUP | Age: 73
End: 2023-06-16
Payer: MEDICARE

## 2023-06-16 VITALS
HEART RATE: 75 BPM | BODY MASS INDEX: 27.92 KG/M2 | OXYGEN SATURATION: 98 % | WEIGHT: 199.4 LBS | HEIGHT: 71 IN | DIASTOLIC BLOOD PRESSURE: 58 MMHG | SYSTOLIC BLOOD PRESSURE: 90 MMHG | TEMPERATURE: 97.4 F

## 2023-06-16 DIAGNOSIS — E78.5 DYSLIPIDEMIA: ICD-10-CM

## 2023-06-16 DIAGNOSIS — M54.2 NECK PAIN: ICD-10-CM

## 2023-06-16 DIAGNOSIS — F32.0 CURRENT MILD EPISODE OF MAJOR DEPRESSIVE DISORDER WITHOUT PRIOR EPISODE (HCC): ICD-10-CM

## 2023-06-16 DIAGNOSIS — R73.03 PREDIABETES: ICD-10-CM

## 2023-06-16 PROCEDURE — 99214 OFFICE O/P EST MOD 30 MIN: CPT | Performed by: FAMILY MEDICINE

## 2023-06-16 PROCEDURE — 3078F DIAST BP <80 MM HG: CPT | Performed by: FAMILY MEDICINE

## 2023-06-16 PROCEDURE — 3074F SYST BP LT 130 MM HG: CPT | Performed by: FAMILY MEDICINE

## 2023-06-16 RX ORDER — ESCITALOPRAM OXALATE 10 MG/1
10 TABLET ORAL DAILY
Qty: 90 TABLET | Refills: 1 | Status: SHIPPED | OUTPATIENT
Start: 2023-06-16

## 2023-06-16 RX ORDER — METHOCARBAMOL 750 MG/1
750 TABLET, FILM COATED ORAL 4 TIMES DAILY
Qty: 56 TABLET | Refills: 0 | Status: SHIPPED | OUTPATIENT
Start: 2023-06-16 | End: 2023-06-30

## 2023-06-16 ASSESSMENT — FIBROSIS 4 INDEX: FIB4 SCORE: 2.41

## 2023-06-16 NOTE — PROGRESS NOTES
"Subjective:     Chief Complaint   Patient presents with    Follow-Up       HPI:   Deepak presents today to discuss the following.    Neck pain  New problem  Has had off/on posterior back pain  Taking ibuprofen    Prediabetes  Chronic issue  At 5.8%  On metformin 500mg BID    Dyslipidemia  Chronic issue  Stable on statin    Past Medical History:   Diagnosis Date    cancer 2004    lymphoma    Cancer (HCC) 2007    thyroid    Depression     Heart burn     High cholesterol     Kidney stones     Lymphoma (HCC)     2004    Snoring     Thyroid disease        Current Outpatient Medications Ordered in Epic   Medication Sig Dispense Refill    methocarbamol (ROBAXIN) 750 MG Tab Take 1 Tablet by mouth 4 times a day for 14 days. 56 Tablet 0    escitalopram (LEXAPRO) 10 MG Tab Take 1 Tablet by mouth every day. 90 Tablet 1    metFORMIN (GLUCOPHAGE) 500 MG Tab Take 1 Tablet by mouth 2 times a day with meals. 180 Tablet 3    lisinopril-hydrochlorothiazide (PRINZIDE) 20-12.5 MG per tablet Take 1 Tablet by mouth every day. 90 Tablet 3    atorvastatin (LIPITOR) 20 MG Tab Take 1 Tablet by mouth every day. 90 Tablet 3    therapeutic multivitamin-minerals (THERAGRAN-M) Tab Take 1 Tab by mouth every day.      SYNTHROID 112 MCG Tab Take 100 mcg by mouth every day.       No current [facility]-ordered facility-administered medications on file.       Allergies:  Patient has no known allergies.    Health Maintenance: Completed    ROS:  Gen: no fevers/chills, no changes in weight  Eyes: no changes in vision  Pulm: no sob, no cough  CV: no chest pain, no palpitations  GI: no nausea/vomiting, no diarrhea      Objective:     Exam:  BP 90/58 (BP Location: Left arm, Patient Position: Sitting, BP Cuff Size: Adult)   Pulse 75   Temp 36.3 °C (97.4 °F) (Temporal)   Ht 1.803 m (5' 11\")   Wt 90.4 kg (199 lb 6.4 oz)   SpO2 98%   BMI 27.81 kg/m²  Body mass index is 27.81 kg/m².    Constitutional: Alert, no distress, well-groomed.  Skin: Warm, dry, good turgor, " no rashes in visible areas.  Eye: Equal, round and reactive, conjunctiva clear, lids normal.  ENMT: Lips without lesions, good dentition, moist mucous membranes.  Neck: Trachea midline, no masses, no thyromegaly.  Respiratory: Unlabored respiratory effort, no cough.  MSK: Normal gait, moves all extremities.  Neuro: Grossly non-focal.   Psych: Alert and oriented x3, normal affect and mood.        Assessment & Plan:     72 y.o. male with the following -     1. Neck pain  New problem.  I will treat with Robaxin and monitor for improvement.  - methocarbamol (ROBAXIN) 750 MG Tab; Take 1 Tablet by mouth 4 times a day for 14 days.  Dispense: 56 Tablet; Refill: 0    2. Prediabetes  Chronic, stable condition.  Continue with lifestyle change.  Continue with metformin.    3. Dyslipidemia  Chronic, stable condition.  Continue with statin.    4. Current mild episode of major depressive disorder without prior episode (HCC)  New problem.  The patient has been feeling depressed.  At this time he inquires about possibly starting medication to help him feel better.  He is amenable to a trial of Lexapro with 6-week reassessment.      Return in about 6 weeks (around 7/28/2023).          Please note that this dictation was created using voice recognition software. I have made every reasonable attempt to correct obvious errors, but I expect that there are errors of grammar and possibly content that I did not discover before finalizing the note.

## 2023-07-28 ENCOUNTER — OFFICE VISIT (OUTPATIENT)
Dept: MEDICAL GROUP | Facility: PHYSICIAN GROUP | Age: 73
End: 2023-07-28
Payer: MEDICARE

## 2023-07-28 VITALS
TEMPERATURE: 98.5 F | HEART RATE: 70 BPM | HEIGHT: 71 IN | SYSTOLIC BLOOD PRESSURE: 114 MMHG | OXYGEN SATURATION: 97 % | WEIGHT: 194.6 LBS | DIASTOLIC BLOOD PRESSURE: 60 MMHG | BODY MASS INDEX: 27.24 KG/M2

## 2023-07-28 DIAGNOSIS — Z00.00 MEDICARE ANNUAL WELLNESS VISIT, SUBSEQUENT: Primary | ICD-10-CM

## 2023-07-28 DIAGNOSIS — E89.0 POSTOPERATIVE HYPOTHYROIDISM: ICD-10-CM

## 2023-07-28 DIAGNOSIS — E78.5 DYSLIPIDEMIA: ICD-10-CM

## 2023-07-28 DIAGNOSIS — R73.03 PREDIABETES: ICD-10-CM

## 2023-07-28 PROCEDURE — 3078F DIAST BP <80 MM HG: CPT | Performed by: FAMILY MEDICINE

## 2023-07-28 PROCEDURE — G0439 PPPS, SUBSEQ VISIT: HCPCS | Performed by: FAMILY MEDICINE

## 2023-07-28 PROCEDURE — 3074F SYST BP LT 130 MM HG: CPT | Performed by: FAMILY MEDICINE

## 2023-07-28 ASSESSMENT — ACTIVITIES OF DAILY LIVING (ADL): BATHING_REQUIRES_ASSISTANCE: 0

## 2023-07-28 ASSESSMENT — FIBROSIS 4 INDEX: FIB4 SCORE: 2.41

## 2023-07-28 ASSESSMENT — PATIENT HEALTH QUESTIONNAIRE - PHQ9: CLINICAL INTERPRETATION OF PHQ2 SCORE: 0

## 2023-07-28 ASSESSMENT — ENCOUNTER SYMPTOMS: GENERAL WELL-BEING: FAIR

## 2023-07-28 NOTE — PROGRESS NOTES
Chief Complaint   Patient presents with    Medicare Annual Wellness       HPI:  Deepak is a 72 y.o. here for Medicare Annual Wellness Visit      Patient Active Problem List    Diagnosis Date Noted    Neck pain 06/16/2023    Current mild episode of major depressive disorder without prior episode (HCC) 06/16/2023    Prediabetes 03/16/2023    Pain in the chest 03/10/2019    Essential hypertension 03/10/2019    BMI 30.0-30.9,adult 03/11/2016    Hypothyroidism 03/11/2016    Dyslipidemia 03/11/2016    History of lymphoma 03/11/2016    Calculus of kidney 09/16/2015       Current Outpatient Medications   Medication Sig Dispense Refill    escitalopram (LEXAPRO) 10 MG Tab Take 1 Tablet by mouth every day. 90 Tablet 1    metFORMIN (GLUCOPHAGE) 500 MG Tab Take 1 Tablet by mouth 2 times a day with meals. 180 Tablet 3    lisinopril-hydrochlorothiazide (PRINZIDE) 20-12.5 MG per tablet Take 1 Tablet by mouth every day. 90 Tablet 3    atorvastatin (LIPITOR) 20 MG Tab Take 1 Tablet by mouth every day. 90 Tablet 3    therapeutic multivitamin-minerals (THERAGRAN-M) Tab Take 1 Tab by mouth every day.      SYNTHROID 112 MCG Tab Take 100 mcg by mouth every day.       No current facility-administered medications for this visit.        Patient is taking medications as noted in medication list.  Current supplements as per medication list.     Allergies: Patient has no known allergies.    Current social contact/activities: working     Is patient current with immunizations? No, due for PREVNAR (PCV20) . Patient is interested in receiving NONE today.  Pt declines today     He  reports that he has never smoked. He has never used smokeless tobacco. He reports that he does not drink alcohol and does not use drugs.  Counseling given: Not Answered      ROS:    Gait: Uses no assistive device   Ostomy: No   Other tubes: No   Amputations: No   Chronic oxygen use No   Last eye exam 3x years ago    Wears hearing aids: No   : Denies any urinary leakage  during the last 6 months    Screening:    Depression Screening  Little interest or pleasure in doing things?  0 - not at all  Feeling down, depressed, or hopeless? 0 - not at all  Trouble falling or staying asleep, or sleeping too much?     Feeling tired or having little energy?     Poor appetite or overeating?     Feeling bad about yourself - or that you are a failure or have let yourself or your family down?    Trouble concentrating on things, such as reading the newspaper or watching television?    Moving or speaking so slowly that other people could have noticed.  Or the opposite - being so fidgety or restless that you have been moving around a lot more than usual?     Thoughts that you would be better off dead, or of hurting yourself?     Patient Health Questionnaire Score:      If depressive symptoms identified deferred to follow up visit unless specifically addressed in assessment and plan.    Interpretation of PHQ-9 Total Score   Score Severity   1-4 No Depression   5-9 Mild Depression   10-14 Moderate Depression   15-19 Moderately Severe Depression   20-27 Severe Depression    Screening for Cognitive Impairment  Three Minute Recall (Banana, Sunrise, Chair)  2/3    Draw clock face with all 12 numbers and set the hands to show 20 past 8.  No    If cognitive concerns identified, deferred for follow up unless specifically addressed in assessment and plan.    Fall Risk Assessment  Has the patient had two or more falls in the last year or any fall with injury in the last year?  No  If fall risk identified, deferred for follow up unless specifically addressed in assessment and plan.    Safety Assessment  Throw rugs on floor.  Yes  Handrails on all stairs.  Yes  Good lighting in all hallways.  Yes  Difficulty hearing.  Yes  Patient counseled about all safety risks that were identified.    Functional Assessment ADLs  Are there any barriers preventing you from cooking for yourself or meeting nutritional needs?  No.     Are there any barriers preventing you from driving safely or obtaining transportation?  No.    Are there any barriers preventing you from using a telephone or calling for help?  No.    Are there any barriers preventing you from shopping?  No.    Are there any barriers preventing you from taking care of your own finances?  No.    Are there any barriers preventing you from managing your medications?  No.    Are there any barriers preventing you from showering, bathing or dressing yourself?  No.    Are you currently engaging in any exercise or physical activity?  Yes.  Working outside  What is your perception of your health?  Fair.    Advance Care Planning  Do you have an Advance Directive, Living Will, Durable Power of , or POLST? No               Health Maintenance Summary            Overdue - HEPATITIS C SCREENING (Once) Overdue - never done      No completion history exists for this topic.              Overdue - IMM DTaP/Tdap/Td Vaccine (1 - Tdap) Overdue - never done      No completion history exists for this topic.              Overdue - IMM ZOSTER VACCINES (1 of 2) Overdue - never done      No completion history exists for this topic.              Overdue - COVID-19 Vaccine (2 - Pfizer series) Overdue since 2/26/2023      10/26/2022  Outside Immunization: COVID mRNA Bivalent(MOD)    05/27/2022  Outside Immunization: COVID-19 mRNA (MOD)    11/04/2021  Outside Immunization: COVID-19 mRNA (MOD)    03/02/2021  Imm Admin: MODERNA SARS-COV-2 VACCINE (12+)    02/02/2021  Imm Admin: MODERNA SARS-COV-2 VACCINE (12+)              Postponed - IMM PNEUMOCOCCAL VACCINE: 65+ Years (2 - PCV) Postponed until 7/28/2024 11/04/2021  Outside Immunization: PPSV23              IMM INFLUENZA (1) Next due on 9/1/2023 11/04/2021  Outside Immunization: Influenza Quad Adjuvanted              Annual Wellness Visit (Every 366 Days) Next due on 7/28/2024 07/28/2023  Level of Service: ANNUAL WELLNESS VISIT-INCLUDES  PPPS SUBSEQUE*    07/28/2023  Visit Dx: Medicare annual wellness visit, subsequent    03/11/2016  Done    03/11/2016  Visit Dx: Medicare annual wellness visit, initial              COLORECTAL CANCER SCREENING (COLONOSCOPY - Every 10 Years) Next due on 3/11/2026      03/11/2016  COLONOSCOPY (Postponed - Patient states he had a colonoscopy in 2014 and is on every 5 year schedule.)              IMM HEP B VACCINE (Series Information) Aged Out      No completion history exists for this topic.              HPV Vaccines (Series Information) Aged Out      No completion history exists for this topic.              IMM MENINGOCOCCAL ACWY VACCINE (Series Information) Aged Out      No completion history exists for this topic.                    Patient Care Team:  Shad Jensen M.D. as PCP - General (Family Medicine)  Mallory Howard M.D. as Consulting Physician (Endocrinology)  Sd Khanna M.D. as Consulting Physician (Urology)    Social History     Tobacco Use    Smoking status: Never    Smokeless tobacco: Never   Vaping Use    Vaping Use: Never used   Substance Use Topics    Alcohol use: No     Comment: quit drinking about a year ago    Drug use: No     Family History   Problem Relation Age of Onset    Cancer Sister         breast     He  has a past medical history of cancer (2004), Cancer (HCC) (2007), Depression, Heart burn, High cholesterol, Kidney stones, Lymphoma (HCC), Snoring, and Thyroid disease.    He has no past medical history of Anesthesia.   Past Surgical History:   Procedure Laterality Date    CYSTOSCOPY  9/16/2015    Procedure: CYSTOSCOPY;  Surgeon: Sd Khanna M.D.;  Location: [facility];  Service:     URETEROSCOPY Right 9/16/2015    Procedure: URETEROSCOPY;  Surgeon: Sd Khanna M.D.;  Location: [facility];  Service:     LASERTRIPSY Right 9/16/2015    Procedure: LASERTRIPSY LITHO;  Surgeon: Sd Khanna M.D.;  Location: SURGERY [facility];  Service:      "THYROIDECTOMY  2007    OTHER      lymph nodes removed       Exam:   /60 (BP Location: Left arm, Patient Position: Sitting, BP Cuff Size: Adult)   Pulse 70   Temp 36.9 °C (98.5 °F) (Temporal)   Ht 1.803 m (5' 11\")   Wt 88.3 kg (194 lb 9.6 oz)   SpO2 97%  Body mass index is 27.14 kg/m².    Hearing excellent.    Dentition good  Alert, oriented in no acute distress  Eye contact is good, speech goal directed, affect calm      Assessment and Plan. The following treatment and monitoring plan is recommended:    1. Prediabetes  Chronic condition.  Continue with metformin  2. Postoperative hypothyroidism  Chronic condition.  Continue with Synthroid  3. Dyslipidemia  Chronic condition.  Continue with atorvastatin.  4. Medicare annual wellness visit, subsequent       Services suggested: No services needed at this time  Health Care Screening recommendations as per orders if indicated.  Referrals offered: PT/OT/Nutrition counseling/Behavioral Health/Smoking cessation as per orders if indicated.    Discussion today about general wellness and lifestyle habits:    Prevent falls and reduce trip hazards; Cautioned about securing or removing rugs.  Have a working fire alarm and carbon monoxide detector;   Engage in regular physical activity and social activities.     Follow-up: No follow-ups on file.  "

## 2023-07-31 NOTE — PROGRESS NOTES
Assessment and admit profile completed. Pt A&O x 4. Respirations are even and unlabored on RA. Neuro intact. POC updated. Pt educated on room and call light. Pt verbalizes understanding. Whiteboard updated. Pt denies pain at this time. Monitors applied, SR on tele with a 1st degree block. Call light and belongings within reach. Needs met, will continue to monitor.    No care due was identified.  [facility] Embedded Care Due Messages. Reference number: [id number].   7/31/2023 12:10:27 PM CDT

## 2023-08-11 ENCOUNTER — TELEPHONE (OUTPATIENT)
Dept: HEALTH INFORMATION MANAGEMENT | Facility: OTHER | Age: 73
End: 2023-08-11

## 2023-09-26 ENCOUNTER — OFFICE VISIT (OUTPATIENT)
Dept: URGENT CARE | Facility: PHYSICIAN GROUP | Age: 73
End: 2023-09-26
Payer: MEDICARE

## 2023-09-26 ENCOUNTER — APPOINTMENT (OUTPATIENT)
Dept: URGENT CARE | Facility: PHYSICIAN GROUP | Age: 73
End: 2023-09-26

## 2023-09-26 VITALS
WEIGHT: 196 LBS | DIASTOLIC BLOOD PRESSURE: 64 MMHG | HEART RATE: 75 BPM | TEMPERATURE: 97.6 F | RESPIRATION RATE: 16 BRPM | OXYGEN SATURATION: 95 % | BODY MASS INDEX: 27.44 KG/M2 | HEIGHT: 71 IN | SYSTOLIC BLOOD PRESSURE: 118 MMHG

## 2023-09-26 DIAGNOSIS — R73.03 PREDIABETES: ICD-10-CM

## 2023-09-26 DIAGNOSIS — E78.5 DYSLIPIDEMIA: ICD-10-CM

## 2023-09-26 DIAGNOSIS — F32.0 CURRENT MILD EPISODE OF MAJOR DEPRESSIVE DISORDER WITHOUT PRIOR EPISODE (HCC): ICD-10-CM

## 2023-09-26 DIAGNOSIS — I10 ESSENTIAL HYPERTENSION: ICD-10-CM

## 2023-09-26 PROCEDURE — 99213 OFFICE O/P EST LOW 20 MIN: CPT | Performed by: PHYSICIAN ASSISTANT

## 2023-09-26 PROCEDURE — 3074F SYST BP LT 130 MM HG: CPT | Performed by: PHYSICIAN ASSISTANT

## 2023-09-26 PROCEDURE — 3078F DIAST BP <80 MM HG: CPT | Performed by: PHYSICIAN ASSISTANT

## 2023-09-26 RX ORDER — LISINOPRIL AND HYDROCHLOROTHIAZIDE 20; 12.5 MG/1; MG/1
1 TABLET ORAL DAILY
Qty: 90 TABLET | Refills: 0 | Status: SHIPPED | OUTPATIENT
Start: 2023-09-26 | End: 2023-12-25

## 2023-09-26 RX ORDER — ATORVASTATIN CALCIUM 20 MG/1
20 TABLET, FILM COATED ORAL NIGHTLY
Qty: 90 TABLET | Refills: 0 | Status: SHIPPED | OUTPATIENT
Start: 2023-09-26 | End: 2023-12-25

## 2023-09-26 RX ORDER — ESCITALOPRAM OXALATE 10 MG/1
10 TABLET ORAL DAILY
Qty: 90 TABLET | Refills: 0 | Status: SHIPPED | OUTPATIENT
Start: 2023-09-26

## 2023-09-26 ASSESSMENT — ENCOUNTER SYMPTOMS
SHORTNESS OF BREATH: 0
DIZZINESS: 0
HEADACHES: 0
BLURRED VISION: 0
PALPITATIONS: 0
DOUBLE VISION: 0

## 2023-09-26 ASSESSMENT — FIBROSIS 4 INDEX: FIB4 SCORE: 2.45

## 2023-09-26 NOTE — PROGRESS NOTES
Subjective:     CHIEF COMPLAINT  Chief Complaint   Patient presents with    Medication Refill     Med refill on all meds, pt inbetween finding providers, pt needed most atorvastatin.       HPI  Deepak Macdonald is a very pleasant 73 y.o. male who presents to the clinic requesting medication refill.  Patient has a past medical history significant for essential hypertension, dyslipidemia, prediabetes and major depressive disorder.  He has been stable on his medications for multiple years.  Unfortunately he recently lost his PCP and is in the middle of establishing with a new provider.  Currently asymptomatic in clinic.    REVIEW OF SYSTEMS  Review of Systems   Eyes:  Negative for blurred vision and double vision.   Respiratory:  Negative for shortness of breath.    Cardiovascular:  Negative for chest pain and palpitations.   Neurological:  Negative for dizziness and headaches.       PAST MEDICAL HISTORY  Patient Active Problem List    Diagnosis Date Noted    Neck pain 06/16/2023    Current mild episode of major depressive disorder without prior episode (HCC) 06/16/2023    Prediabetes 03/16/2023    Pain in the chest 03/10/2019    Essential hypertension 03/10/2019    BMI 30.0-30.9,adult 03/11/2016    Hypothyroidism 03/11/2016    Dyslipidemia 03/11/2016    History of lymphoma 03/11/2016    Calculus of kidney 09/16/2015       SURGICAL HISTORY   has a past surgical history that includes thyroidectomy (2007); other; cystoscopy (9/16/2015); ureteroscopy (Right, 9/16/2015); and lasertripsy (Right, 9/16/2015).    ALLERGIES  No Known Allergies    CURRENT MEDICATIONS  Home Medications       Reviewed by Matthew Capellan P.A.-C. (Physician Assistant) on 09/26/23 at 0851  Med List Status: <None>     Medication Last Dose Status   atorvastatin (LIPITOR) 20 MG Tab Taking Active   escitalopram (LEXAPRO) 10 MG Tab Taking Active   lisinopril-hydrochlorothiazide (PRINZIDE) 20-12.5 MG per tablet Taking Active   metFORMIN (GLUCOPHAGE) 500  "MG Tab Taking Active   SYNTHROID 112 MCG Tab Taking Active   therapeutic multivitamin-minerals (THERAGRAN-M) Tab Taking Active                    SOCIAL HISTORY  Social History     Tobacco Use    Smoking status: Never    Smokeless tobacco: Never   Vaping Use    Vaping Use: Never used   Substance and Sexual Activity    Alcohol use: Yes     Comment: Rarely    Drug use: No    Sexual activity: Yes     Partners: Female       FAMILY HISTORY  Family History   Problem Relation Age of Onset    Cancer Sister         breast          Objective:     VITAL SIGNS: /64 (BP Location: Right arm, Patient Position: Sitting, BP Cuff Size: Adult)   Pulse 75   Temp 36.4 °C (97.6 °F) (Temporal)   Resp 16   Ht 1.803 m (5' 11\")   Wt 88.9 kg (196 lb)   SpO2 95%   BMI 27.34 kg/m²     PHYSICAL EXAM  Physical Exam  Constitutional:       Appearance: Normal appearance.   HENT:      Head: Normocephalic and atraumatic.   Eyes:      Conjunctiva/sclera: Conjunctivae normal.   Cardiovascular:      Rate and Rhythm: Normal rate and regular rhythm.      Pulses: Normal pulses.      Heart sounds: Normal heart sounds.   Pulmonary:      Effort: Pulmonary effort is normal.   Neurological:      Mental Status: He is alert.         Assessment/Plan:     1. Essential hypertension  - lisinopril-hydrochlorothiazide (PRINZIDE) 20-12.5 MG per tablet; Take 1 Tablet by mouth every day for 90 days.  Dispense: 90 Tablet; Refill: 0    2. Dyslipidemia  - atorvastatin (LIPITOR) 20 MG Tab; Take 1 Tablet by mouth every evening for 90 days.  Dispense: 90 Tablet; Refill: 0    3. Prediabetes  - metFORMIN (GLUCOPHAGE) 500 MG Tab; Take 1 Tablet by mouth 2 times a day with meals for 90 days.  Dispense: 180 Tablet; Refill: 0    4. Current mild episode of major depressive disorder without prior episode (HCC)  - escitalopram (LEXAPRO) 10 MG Tab; Take 1 Tablet by mouth every day.  Dispense: 90 Tablet; Refill: 0      MDM/Comments:    Patient presents today requesting " medication refill.  She has been stable on all prior medications for multiple years.  Refills provided.  He is currently in the process of establishing with a new PCP.    Differential diagnosis, natural history, supportive care, and indications for immediate follow-up discussed. All questions answered. Patient agrees with the plan of care.    Follow-up as needed if symptoms worsen or fail to improve to PCP, Urgent care or Emergency Room.    I have personally reviewed prior external notes and test results pertinent to today's visit.  I have independently reviewed and interpreted all diagnostics ordered during this urgent care acute visit.   Discussed management options (risks,benefits, and alternatives to treatment). Pt expresses understanding and the treatment plan was agreed upon. Questions were encouraged and answered to pt's satisfaction.    Please note that this dictation was created using voice recognition software. I have made a reasonable attempt to correct obvious errors, but I expect that there are errors of grammar and possibly content that I did not discover before finalizing the note.

## 2024-07-22 ENCOUNTER — OFFICE VISIT (OUTPATIENT)
Dept: URGENT CARE | Facility: PHYSICIAN GROUP | Age: 74
End: 2024-07-22
Payer: MEDICARE

## 2024-07-22 VITALS
HEIGHT: 71 IN | OXYGEN SATURATION: 97 % | HEART RATE: 81 BPM | BODY MASS INDEX: 27.44 KG/M2 | RESPIRATION RATE: 20 BRPM | DIASTOLIC BLOOD PRESSURE: 60 MMHG | WEIGHT: 196 LBS | TEMPERATURE: 97.5 F | SYSTOLIC BLOOD PRESSURE: 118 MMHG

## 2024-07-22 DIAGNOSIS — D23.5 DERMOID CYST OF SKIN OF BACK: ICD-10-CM

## 2024-07-22 PROCEDURE — 3078F DIAST BP <80 MM HG: CPT

## 2024-07-22 PROCEDURE — 3074F SYST BP LT 130 MM HG: CPT

## 2024-07-22 PROCEDURE — 99213 OFFICE O/P EST LOW 20 MIN: CPT

## 2024-07-22 RX ORDER — DOXYCYCLINE 100 MG/1
100 CAPSULE ORAL 2 TIMES DAILY
Qty: 14 CAPSULE | Refills: 0 | Status: SHIPPED | OUTPATIENT
Start: 2024-07-22 | End: 2024-07-29

## 2024-07-22 ASSESSMENT — ENCOUNTER SYMPTOMS
MYALGIAS: 0
CHILLS: 0
HEADACHES: 0
ABDOMINAL PAIN: 0
FEVER: 0
SHORTNESS OF BREATH: 0
COUGH: 0
NAUSEA: 0
DIARRHEA: 0
VOMITING: 0
SORE THROAT: 0

## 2024-07-22 ASSESSMENT — FIBROSIS 4 INDEX: FIB4 SCORE: 2.4
